# Patient Record
Sex: MALE | Race: WHITE | NOT HISPANIC OR LATINO
[De-identification: names, ages, dates, MRNs, and addresses within clinical notes are randomized per-mention and may not be internally consistent; named-entity substitution may affect disease eponyms.]

---

## 2017-12-05 ENCOUNTER — APPOINTMENT (OUTPATIENT)
Dept: FAMILY MEDICINE | Facility: CLINIC | Age: 47
End: 2017-12-05
Payer: COMMERCIAL

## 2017-12-05 VITALS
DIASTOLIC BLOOD PRESSURE: 84 MMHG | HEART RATE: 69 BPM | OXYGEN SATURATION: 97 % | HEIGHT: 72 IN | SYSTOLIC BLOOD PRESSURE: 114 MMHG | TEMPERATURE: 97.6 F | BODY MASS INDEX: 24.65 KG/M2 | RESPIRATION RATE: 18 BRPM | WEIGHT: 182 LBS

## 2017-12-05 DIAGNOSIS — Z11.3 ENCOUNTER FOR SCREENING FOR INFECTIONS WITH A PREDOMINANTLY SEXUAL MODE OF TRANSMISSION: ICD-10-CM

## 2017-12-05 DIAGNOSIS — Z63.5 DISRUPTION OF FAMILY BY SEPARATION AND DIVORCE: ICD-10-CM

## 2017-12-05 DIAGNOSIS — Z78.9 OTHER SPECIFIED HEALTH STATUS: ICD-10-CM

## 2017-12-05 DIAGNOSIS — F17.200 NICOTINE DEPENDENCE, UNSPECIFIED, UNCOMPLICATED: ICD-10-CM

## 2017-12-05 DIAGNOSIS — Z82.49 FAMILY HISTORY OF ISCHEMIC HEART DISEASE AND OTHER DISEASES OF THE CIRCULATORY SYSTEM: ICD-10-CM

## 2017-12-05 DIAGNOSIS — F15.90 OTHER STIMULANT USE, UNSPECIFIED, UNCOMPLICATED: ICD-10-CM

## 2017-12-05 PROCEDURE — 36415 COLL VENOUS BLD VENIPUNCTURE: CPT

## 2017-12-05 PROCEDURE — 99406 BEHAV CHNG SMOKING 3-10 MIN: CPT

## 2017-12-05 PROCEDURE — 99396 PREV VISIT EST AGE 40-64: CPT | Mod: 25

## 2017-12-05 SDOH — SOCIAL STABILITY - SOCIAL INSECURITY: DISRUPTION OF FAMILY BY SEPARATION AND DIVORCE: Z63.5

## 2017-12-06 DIAGNOSIS — N52.9 MALE ERECTILE DYSFUNCTION, UNSPECIFIED: ICD-10-CM

## 2017-12-06 LAB
ALBUMIN SERPL ELPH-MCNC: 4.5 G/DL
ALP BLD-CCNC: 67 U/L
ALT SERPL-CCNC: 22 U/L
ANION GAP SERPL CALC-SCNC: 14 MMOL/L
AST SERPL-CCNC: 17 U/L
BASOPHILS # BLD AUTO: 0.03 K/UL
BASOPHILS NFR BLD AUTO: 0.4 %
BILIRUB SERPL-MCNC: 0.4 MG/DL
BUN SERPL-MCNC: 9 MG/DL
CALCIUM SERPL-MCNC: 9.8 MG/DL
CHLORIDE SERPL-SCNC: 100 MMOL/L
CHOLEST SERPL-MCNC: 264 MG/DL
CHOLEST/HDLC SERPL: 3.6 RATIO
CO2 SERPL-SCNC: 27 MMOL/L
CREAT SERPL-MCNC: 0.76 MG/DL
EOSINOPHIL # BLD AUTO: 0.13 K/UL
EOSINOPHIL NFR BLD AUTO: 1.7 %
GLUCOSE SERPL-MCNC: 98 MG/DL
HBA1C MFR BLD HPLC: 5.7 %
HCT VFR BLD CALC: 46 %
HDLC SERPL-MCNC: 74 MG/DL
HGB BLD-MCNC: 14.9 G/DL
HIV1+2 AB SPEC QL IA.RAPID: NONREACTIVE
HSV 1+2 IGG SER IA-IMP: NEGATIVE
HSV 1+2 IGG SER IA-IMP: POSITIVE
HSV1 IGG SER QL: 29.3 INDEX
HSV2 IGG SER QL: 0.12 INDEX
IMM GRANULOCYTES NFR BLD AUTO: 0.3 %
LDLC SERPL CALC-MCNC: 167 MG/DL
LYMPHOCYTES # BLD AUTO: 1.5 K/UL
LYMPHOCYTES NFR BLD AUTO: 19.3 %
MAN DIFF?: NORMAL
MCHC RBC-ENTMCNC: 30.6 PG
MCHC RBC-ENTMCNC: 32.4 GM/DL
MCV RBC AUTO: 94.5 FL
MONOCYTES # BLD AUTO: 0.75 K/UL
MONOCYTES NFR BLD AUTO: 9.6 %
NEUTROPHILS # BLD AUTO: 5.36 K/UL
NEUTROPHILS NFR BLD AUTO: 68.7 %
PLATELET # BLD AUTO: 335 K/UL
POTASSIUM SERPL-SCNC: 4.8 MMOL/L
PROT SERPL-MCNC: 7.4 G/DL
RBC # BLD: 4.87 M/UL
RBC # FLD: 14.7 %
SODIUM SERPL-SCNC: 141 MMOL/L
T PALLIDUM AB SER QL IA: NEGATIVE
T4 FREE SERPL-MCNC: 1.2 NG/DL
TRIGL SERPL-MCNC: 116 MG/DL
TSH SERPL-ACNC: 2.11 UIU/ML
WBC # FLD AUTO: 7.79 K/UL

## 2017-12-07 LAB
C TRACH RRNA SPEC QL NAA+PROBE: NOT DETECTED
N GONORRHOEA RRNA SPEC QL NAA+PROBE: NOT DETECTED
SOURCE AMPLIFICATION: NORMAL

## 2017-12-08 LAB
HSV1 IGM SER QL: NORMAL TITER
HSV2 AB FLD-ACNC: NORMAL TITER

## 2017-12-12 ENCOUNTER — CLINICAL ADVICE (OUTPATIENT)
Age: 47
End: 2017-12-12

## 2018-07-24 PROBLEM — Z78.9 ALCOHOL USE: Status: ACTIVE | Noted: 2017-12-05

## 2019-01-08 ENCOUNTER — EMERGENCY (EMERGENCY)
Facility: HOSPITAL | Age: 49
LOS: 1 days | Discharge: HOME | End: 2019-01-08
Attending: EMERGENCY MEDICINE
Payer: MEDICAID

## 2019-01-08 VITALS
OXYGEN SATURATION: 95 % | RESPIRATION RATE: 19 BRPM | TEMPERATURE: 99 F | SYSTOLIC BLOOD PRESSURE: 108 MMHG | HEART RATE: 70 BPM | DIASTOLIC BLOOD PRESSURE: 72 MMHG

## 2019-01-08 VITALS
OXYGEN SATURATION: 98 % | SYSTOLIC BLOOD PRESSURE: 107 MMHG | HEIGHT: 72 IN | TEMPERATURE: 98 F | RESPIRATION RATE: 16 BRPM | DIASTOLIC BLOOD PRESSURE: 74 MMHG | HEART RATE: 79 BPM | WEIGHT: 179.9 LBS

## 2019-01-08 LAB
APPEARANCE UR: CLEAR — SIGNIFICANT CHANGE UP
BACTERIA # UR AUTO: NEGATIVE — SIGNIFICANT CHANGE UP
BILIRUB UR-MCNC: NEGATIVE — SIGNIFICANT CHANGE UP
COLOR SPEC: YELLOW — SIGNIFICANT CHANGE UP
DIFF PNL FLD: NEGATIVE — SIGNIFICANT CHANGE UP
EPI CELLS # UR: 0 /HPF — SIGNIFICANT CHANGE UP
GLUCOSE UR QL: NEGATIVE — SIGNIFICANT CHANGE UP
HYALINE CASTS # UR AUTO: 0 /LPF — SIGNIFICANT CHANGE UP (ref 0–2)
KETONES UR-MCNC: NEGATIVE — SIGNIFICANT CHANGE UP
LEUKOCYTE ESTERASE UR-ACNC: NEGATIVE — SIGNIFICANT CHANGE UP
NITRITE UR-MCNC: NEGATIVE — SIGNIFICANT CHANGE UP
PH UR: 6 — SIGNIFICANT CHANGE UP (ref 5–8)
PROT UR-MCNC: ABNORMAL
RBC CASTS # UR COMP ASSIST: 3 /HPF — SIGNIFICANT CHANGE UP (ref 0–4)
SP GR SPEC: 1.03 — HIGH (ref 1.01–1.02)
UROBILINOGEN FLD QL: ABNORMAL
WBC UR QL: 1 /HPF — SIGNIFICANT CHANGE UP (ref 0–5)

## 2019-01-08 PROCEDURE — 99283 EMERGENCY DEPT VISIT LOW MDM: CPT

## 2019-01-08 PROCEDURE — 81001 URINALYSIS AUTO W/SCOPE: CPT

## 2019-01-08 PROCEDURE — 99282 EMERGENCY DEPT VISIT SF MDM: CPT

## 2019-01-08 RX ORDER — ACETAMINOPHEN 500 MG
975 TABLET ORAL ONCE
Qty: 0 | Refills: 0 | Status: COMPLETED | OUTPATIENT
Start: 2019-01-08 | End: 2019-01-08

## 2019-01-08 RX ADMIN — Medication 975 MILLIGRAM(S): at 16:13

## 2019-01-08 NOTE — ED PROVIDER NOTE - PHYSICAL EXAMINATION
GEN APPEARANCE: WDWN, alert and cooperative, non-toxic appearing and in NAD  HEAD: Atraumatic, normocephalic   EYES: PERRLa, EOMI, vision grossly intact.   EARS: Gross hearing intact.   NOSE: No nasal discharge, no external evidence of epistaxis.   NECK: Supple  CV: RRR, S1S2, no c/r/m/g. No cyanosis or pallor. Extremities warm, well perfused. Cap refill <2 seconds. No bruits.   LUNGS: CTAB. No wheezing. No rales. No rhonchi. No diminished breath sounds.   ABDOMEN: Soft, NTND. No guarding or rebound. No masses.   MSK: Spine appears normal, no spine point tenderness. No CVA ttp. No joint erythema or tenderness. Normal muscular development. Pelvis stable. Mild R flank ttp lateral   EXTREMITIES: No peripheral edema. No obvious joint or bony deformity.  NEURO: Alert, follows commands. Weight bearing normal. Speech normal. Sensation and motor normal x4 extremities.   SKIN: Normal color for race, warm, dry and intact. No evidence of rash.  PSYCH: Normal mood and affect.

## 2019-01-08 NOTE — ED PROVIDER NOTE - CARE PLAN
Principal Discharge DX:	Flank pain  Assessment and plan of treatment:	Thank you for visiting our Emergency Department, it has been a pleasure taking part in your healthcare.    Your discharge diagnosis is: flank pain  Please take all discharge medications as indicated below:  Take Motrin/Tylenol for pain as needed, please follow instructions on manufacturers label. If you have any questions please consult a pharmacist or your PMD.  Please follow up with your PMD within x48 hours.  Return precautions to the Emergency Department include but are not limited to: unrelenting nausea, vomiting, fever, chills, chest pain, shortness of breath, dizziness, chest or abdominal pain, worsening back pain, syncope, blood in urine or stool, headache that doesn't resolve, numbness or tingling, loss of sensation, loss of motor function, or any other concerning symptoms.

## 2019-01-08 NOTE — ED ADULT NURSE NOTE - OBJECTIVE STATEMENT
47 y/o M A&Ox3 with no pertinent PMH presents to the ED c.o ABD pain. Pt. reports he developed ABD pain on Saturday after eating food from a deli. Pt. reports he became nauseous after, began vomiting and experiencing sharp and achy ABD pain. Pt. reports he has not vomited since Sunday. Denies blood in emesis. Denies fever, chills, dizziness, HA, vision changes, dysuria, and hematuria. Pt. denies nausea at this time. Reports ABD pain currently 6/10. Reports he took Tylenol yesterday for pain which provided relief, denies taking any pain medication today. ABD soft and nontender, pos. bowel sounds auscultated. Pt. mentating well. Does not appear to be in any acute distress - nonlabored breathing noted and speaking in full coherent sentences. Safety and comfort provided. As per MD, hold off on lab work at this time.

## 2019-01-08 NOTE — ED PROVIDER NOTE - MEDICAL DECISION MAKING DETAILS
Binh PGY2: 48M no pmh presents with a cc of R sided lower flank pain worsening x4 days, initially was exacerbated in setting of eating questionable sandwich thereafter had repeated episodes n/v x2 days resolved tolerating PO since, now w/ persisting R flank pain described as sharp achy intermittent never had before no prior stones exam vss well appearing mild r lateral lower flank ttp low c/f surgical abdomen low c/f stone however will check urine, po trail reassess Binh PGY2: 48M no pmh presents with a cc of R sided lower flank pain worsening x4 days, initially was exacerbated in setting of eating questionable sandwich thereafter had repeated episodes n/v x2 days resolved tolerating PO since, now w/ persisting R flank pain described as sharp achy intermittent never had before no prior stones exam vss well appearing r lateral lower flank non tender low c/f surgical abdomen low c/f stone however will check urine, po trial reassess  Dany: 48 year old male with right lower flankp ain x 4 days. episodes of n/v x 2 days now resolved and tolterating po.  no sick contacts, no travel. nontoxic appearing, well-appearing male, abdomen soft nt/nd. vss. will get ua to r/o uti/hematuria, pain control, reassess.

## 2019-01-08 NOTE — ED PROVIDER NOTE - PLAN OF CARE
Thank you for visiting our Emergency Department, it has been a pleasure taking part in your healthcare.    Your discharge diagnosis is: flank pain  Please take all discharge medications as indicated below:  Take Motrin/Tylenol for pain as needed, please follow instructions on manufacturers label. If you have any questions please consult a pharmacist or your PMD.  Please follow up with your PMD within x48 hours.  Return precautions to the Emergency Department include but are not limited to: unrelenting nausea, vomiting, fever, chills, chest pain, shortness of breath, dizziness, chest or abdominal pain, worsening back pain, syncope, blood in urine or stool, headache that doesn't resolve, numbness or tingling, loss of sensation, loss of motor function, or any other concerning symptoms.

## 2019-03-05 ENCOUNTER — INPATIENT (INPATIENT)
Facility: HOSPITAL | Age: 49
LOS: 3 days | Discharge: HOME | DRG: 373 | End: 2019-03-09
Attending: SURGERY | Admitting: SURGERY
Payer: MEDICAID

## 2019-03-05 VITALS
HEART RATE: 74 BPM | OXYGEN SATURATION: 97 % | RESPIRATION RATE: 17 BRPM | SYSTOLIC BLOOD PRESSURE: 112 MMHG | WEIGHT: 179.9 LBS | HEIGHT: 72 IN | DIASTOLIC BLOOD PRESSURE: 72 MMHG | TEMPERATURE: 98 F

## 2019-03-05 DIAGNOSIS — K35.32 ACUTE APPENDICITIS WITH PERFORATION, LOCALIZED PERITONITIS, AND GANGRENE, WITHOUT ABSCESS: ICD-10-CM

## 2019-03-05 LAB
ALBUMIN SERPL ELPH-MCNC: 4.5 G/DL — SIGNIFICANT CHANGE UP (ref 3.3–5)
ALP SERPL-CCNC: 64 U/L — SIGNIFICANT CHANGE UP (ref 40–120)
ALT FLD-CCNC: 58 U/L — HIGH (ref 10–45)
ANION GAP SERPL CALC-SCNC: 16 MMOL/L — SIGNIFICANT CHANGE UP (ref 5–17)
APPEARANCE UR: ABNORMAL
AST SERPL-CCNC: 25 U/L — SIGNIFICANT CHANGE UP (ref 10–40)
BACTERIA # UR AUTO: NEGATIVE — SIGNIFICANT CHANGE UP
BASOPHILS # BLD AUTO: 0 K/UL — SIGNIFICANT CHANGE UP (ref 0–0.2)
BASOPHILS NFR BLD AUTO: 0.1 % — SIGNIFICANT CHANGE UP (ref 0–2)
BILIRUB SERPL-MCNC: 0.5 MG/DL — SIGNIFICANT CHANGE UP (ref 0.2–1.2)
BILIRUB UR-MCNC: NEGATIVE — SIGNIFICANT CHANGE UP
BUN SERPL-MCNC: 10 MG/DL — SIGNIFICANT CHANGE UP (ref 7–23)
CALCIUM SERPL-MCNC: 9.5 MG/DL — SIGNIFICANT CHANGE UP (ref 8.4–10.5)
CHLORIDE SERPL-SCNC: 98 MMOL/L — SIGNIFICANT CHANGE UP (ref 96–108)
CO2 SERPL-SCNC: 23 MMOL/L — SIGNIFICANT CHANGE UP (ref 22–31)
COLOR SPEC: YELLOW — SIGNIFICANT CHANGE UP
CREAT SERPL-MCNC: 0.82 MG/DL — SIGNIFICANT CHANGE UP (ref 0.5–1.3)
DIFF PNL FLD: NEGATIVE — SIGNIFICANT CHANGE UP
EOSINOPHIL # BLD AUTO: 0 K/UL — SIGNIFICANT CHANGE UP (ref 0–0.5)
EOSINOPHIL NFR BLD AUTO: 0.1 % — SIGNIFICANT CHANGE UP (ref 0–6)
EPI CELLS # UR: 2 /HPF — SIGNIFICANT CHANGE UP
GAS PNL BLDV: SIGNIFICANT CHANGE UP
GLUCOSE SERPL-MCNC: 127 MG/DL — HIGH (ref 70–99)
GLUCOSE UR QL: NEGATIVE — SIGNIFICANT CHANGE UP
HCT VFR BLD CALC: 41.5 % — SIGNIFICANT CHANGE UP (ref 39–50)
HGB BLD-MCNC: 14.7 G/DL — SIGNIFICANT CHANGE UP (ref 13–17)
HYALINE CASTS # UR AUTO: 8 /LPF — HIGH (ref 0–2)
KETONES UR-MCNC: ABNORMAL
LEUKOCYTE ESTERASE UR-ACNC: NEGATIVE — SIGNIFICANT CHANGE UP
LIDOCAIN IGE QN: 17 U/L — SIGNIFICANT CHANGE UP (ref 7–60)
LYMPHOCYTES # BLD AUTO: 0.8 K/UL — LOW (ref 1–3.3)
LYMPHOCYTES # BLD AUTO: 4.2 % — LOW (ref 13–44)
MCHC RBC-ENTMCNC: 31.2 PG — SIGNIFICANT CHANGE UP (ref 27–34)
MCHC RBC-ENTMCNC: 35.3 GM/DL — SIGNIFICANT CHANGE UP (ref 32–36)
MCV RBC AUTO: 88.2 FL — SIGNIFICANT CHANGE UP (ref 80–100)
MONOCYTES # BLD AUTO: 1.6 K/UL — HIGH (ref 0–0.9)
MONOCYTES NFR BLD AUTO: 8.1 % — SIGNIFICANT CHANGE UP (ref 2–14)
NEUTROPHILS # BLD AUTO: 17.3 K/UL — HIGH (ref 1.8–7.4)
NEUTROPHILS NFR BLD AUTO: 87.6 % — HIGH (ref 43–77)
NITRITE UR-MCNC: NEGATIVE — SIGNIFICANT CHANGE UP
PH UR: 5.5 — SIGNIFICANT CHANGE UP (ref 5–8)
PLATELET # BLD AUTO: 292 K/UL — SIGNIFICANT CHANGE UP (ref 150–400)
POTASSIUM SERPL-MCNC: 4 MMOL/L — SIGNIFICANT CHANGE UP (ref 3.5–5.3)
POTASSIUM SERPL-SCNC: 4 MMOL/L — SIGNIFICANT CHANGE UP (ref 3.5–5.3)
PROT SERPL-MCNC: 7.5 G/DL — SIGNIFICANT CHANGE UP (ref 6–8.3)
PROT UR-MCNC: ABNORMAL
RBC # BLD: 4.7 M/UL — SIGNIFICANT CHANGE UP (ref 4.2–5.8)
RBC # FLD: 12.5 % — SIGNIFICANT CHANGE UP (ref 10.3–14.5)
RBC CASTS # UR COMP ASSIST: 4 /HPF — SIGNIFICANT CHANGE UP (ref 0–4)
SODIUM SERPL-SCNC: 137 MMOL/L — SIGNIFICANT CHANGE UP (ref 135–145)
SP GR SPEC: 1.03 — HIGH (ref 1.01–1.02)
UROBILINOGEN FLD QL: ABNORMAL
WBC # BLD: 19.7 K/UL — HIGH (ref 3.8–10.5)
WBC # FLD AUTO: 19.7 K/UL — HIGH (ref 3.8–10.5)
WBC UR QL: 3 /HPF — SIGNIFICANT CHANGE UP (ref 0–5)

## 2019-03-05 PROCEDURE — 99285 EMERGENCY DEPT VISIT HI MDM: CPT

## 2019-03-05 PROCEDURE — 99222 1ST HOSP IP/OBS MODERATE 55: CPT | Mod: AI

## 2019-03-05 PROCEDURE — 74177 CT ABD & PELVIS W/CONTRAST: CPT | Mod: 26

## 2019-03-05 RX ORDER — PIPERACILLIN AND TAZOBACTAM 4; .5 G/20ML; G/20ML
3.38 INJECTION, POWDER, LYOPHILIZED, FOR SOLUTION INTRAVENOUS EVERY 8 HOURS
Qty: 0 | Refills: 0 | Status: DISCONTINUED | OUTPATIENT
Start: 2019-03-05 | End: 2019-03-09

## 2019-03-05 RX ORDER — SODIUM CHLORIDE 9 MG/ML
1000 INJECTION, SOLUTION INTRAVENOUS
Qty: 0 | Refills: 0 | Status: DISCONTINUED | OUTPATIENT
Start: 2019-03-05 | End: 2019-03-06

## 2019-03-05 RX ORDER — MORPHINE SULFATE 50 MG/1
4 CAPSULE, EXTENDED RELEASE ORAL ONCE
Qty: 0 | Refills: 0 | Status: DISCONTINUED | OUTPATIENT
Start: 2019-03-05 | End: 2019-03-05

## 2019-03-05 RX ORDER — PIPERACILLIN AND TAZOBACTAM 4; .5 G/20ML; G/20ML
3.38 INJECTION, POWDER, LYOPHILIZED, FOR SOLUTION INTRAVENOUS ONCE
Qty: 0 | Refills: 0 | Status: COMPLETED | OUTPATIENT
Start: 2019-03-05 | End: 2019-03-05

## 2019-03-05 RX ORDER — ONDANSETRON 8 MG/1
4 TABLET, FILM COATED ORAL ONCE
Qty: 0 | Refills: 0 | Status: COMPLETED | OUTPATIENT
Start: 2019-03-05 | End: 2019-03-05

## 2019-03-05 RX ORDER — SODIUM CHLORIDE 9 MG/ML
1000 INJECTION INTRAMUSCULAR; INTRAVENOUS; SUBCUTANEOUS ONCE
Qty: 0 | Refills: 0 | Status: COMPLETED | OUTPATIENT
Start: 2019-03-05 | End: 2019-03-05

## 2019-03-05 RX ORDER — METRONIDAZOLE 500 MG
500 TABLET ORAL ONCE
Qty: 0 | Refills: 0 | Status: COMPLETED | OUTPATIENT
Start: 2019-03-05 | End: 2019-03-05

## 2019-03-05 RX ORDER — KETOROLAC TROMETHAMINE 30 MG/ML
15 SYRINGE (ML) INJECTION ONCE
Qty: 0 | Refills: 0 | Status: DISCONTINUED | OUTPATIENT
Start: 2019-03-05 | End: 2019-03-05

## 2019-03-05 RX ORDER — ENOXAPARIN SODIUM 100 MG/ML
40 INJECTION SUBCUTANEOUS EVERY 24 HOURS
Qty: 0 | Refills: 0 | Status: DISCONTINUED | OUTPATIENT
Start: 2019-03-05 | End: 2019-03-09

## 2019-03-05 RX ADMIN — PIPERACILLIN AND TAZOBACTAM 200 GRAM(S): 4; .5 INJECTION, POWDER, LYOPHILIZED, FOR SOLUTION INTRAVENOUS at 20:25

## 2019-03-05 RX ADMIN — MORPHINE SULFATE 4 MILLIGRAM(S): 50 CAPSULE, EXTENDED RELEASE ORAL at 21:41

## 2019-03-05 RX ADMIN — Medication 100 MILLIGRAM(S): at 21:47

## 2019-03-05 RX ADMIN — SODIUM CHLORIDE 1000 MILLILITER(S): 9 INJECTION INTRAMUSCULAR; INTRAVENOUS; SUBCUTANEOUS at 18:28

## 2019-03-05 RX ADMIN — MORPHINE SULFATE 4 MILLIGRAM(S): 50 CAPSULE, EXTENDED RELEASE ORAL at 20:47

## 2019-03-05 RX ADMIN — ONDANSETRON 4 MILLIGRAM(S): 8 TABLET, FILM COATED ORAL at 18:29

## 2019-03-05 RX ADMIN — Medication 15 MILLIGRAM(S): at 20:25

## 2019-03-05 RX ADMIN — Medication 15 MILLIGRAM(S): at 18:29

## 2019-03-05 NOTE — ED PROVIDER NOTE - PHYSICAL EXAMINATION
Cara Givens M.D.:   patient awake alert seen sitting on stretcher uncomfortable appearing .   LUNGS CTAB no wheeze no crackle.   CARD RRR no m/r/g.    Abdomen soft ttp rlq +RCVAT ND no rebound no guarding .   EXT WWP no edema no calf tenderness CV 2+DP/PT bilaterally.   neuro A&Ox3 gait normal.    skin warm and dry no rash  HEENT: dry mucous membranes, PERRL, EOMI

## 2019-03-05 NOTE — H&P ADULT - ATTENDING COMMENTS
Pt seen and examined, agree with above. Pt presented with abdominal pain, was hemodynamically normal, with mild RLQ tenderness. Leukocytosis present, with WBC 19. I personally reviewed pt's CT images, which demonstrated acute appendicitis with cesar-appendiceal inflammatory changes and a small abscess not amenable to percutaneous drainage due to small size. Pt was admitted for nonoperative management of perforated appendicitis with small abscess, with IV antibiotics, NPO until pain improves, IVF, serial abdominal exams. I explained this management to the patient, including rationale for nonoperative management (increased complication rate for operative management of perforated appendicitis).

## 2019-03-05 NOTE — ED PROVIDER NOTE - NO SIGNIFICANT PAST SURGICAL HISTORY
Pt. Presents to ed today for complaints of L knee pain that started 6 weeks ago when he fell down a step. PT. Alert and oriented x4. PT. Placed in position of comfort with call bell in reach.
<<----- Click to add NO significant Past Surgical History

## 2019-03-05 NOTE — H&P ADULT - NSHPSOCIALHISTORY_GEN_ALL_CORE
ETOH: Socially  TOB: 0.5ppd x 10 years; quit few months ago  Illicits: Takes non-prescribed vicodin for hand pain   Lives with girlfriend; works as  at [employer]

## 2019-03-05 NOTE — H&P ADULT - HISTORY OF PRESENT ILLNESS
This is a 49 y/o male with no significant medical history who presents to the ED with c/o abdominal pain for the past 3 days.  Pt states that he first felt similar pain approx 2 months ago and came to the ED and had a negative work-up and was discharged with resolution within a few days time.  This time however he felt similar pain and it was worsening in severity with radiating pain to his RLQ.  He denies nausea, vomiting, fevers, chills, but he does report changes in his bowel pattern and habits.  Pt does report 1 day of anorexia and denies dysuria or frequency or hesitancy.  In the ED pt was hemodynamically stable and received a CT scan of the abdomen and pelvis which revealed a perforated appendicitis with small fluid collection and fat stranding.  General Surgery consulted for surgical evaluation.

## 2019-03-05 NOTE — H&P ADULT - ASSESSMENT
This is a 47 y/o male who resents with abdominal pain x3 days with imaging revealing acute perforated appendicitis    -Admit to Surgery, Dr. Moreno  -Non-op management   -IVF  -NPO  -IV Zosyn  -Serial Abdominal exams  -Routine labs in am  -Ambulate as tolertaed  -Lovenox for DVT prophylaxis  -IS  -D/w Dr. Moreno

## 2019-03-05 NOTE — H&P ADULT - NSHPPHYSICALEXAM_GEN_ALL_CORE
General: NAD, Pleasant  Neurology: Patient is AA&Ox4, follows commands, and speech fluent. EOMI intact, PERRLA, 3mm--2mm. Sensation in the Trigeminal distribution is wnl and symmetrical. Facial muscles intact and symmetrical. Hearing appropriate. Uvula rises equally upon phonation and tongue protrudes symmetrically. SCM/Trapezius 5/5 power.  Neck: Neck supple, trachea midline, No JVD  Respiratory: CTA B/L, (-)rales, rhonchi  CV: S1S2, r/r/r, (-)m/r/g  Abdomen: Soft, non-distended; slight tenderness to RLQ to deep palpation; BSx4  Extremities: 2+ peripheral pulses bilat throughout; (-)edema appreciated  Skin: No Rashes, Hematoma, Ecchymosis

## 2019-03-05 NOTE — ED PROVIDER NOTE - ATTENDING CONTRIBUTION TO CARE
48M no reported pmh p/w 3 days of intermittent n/v/d (NBNB, NB), now also associated with severe intermittent sharp right flank pain with diarrhea today, nausea for three days abd soft, nt, no cva tend, no uti sts. likely gi in nature, r/o kindey stone vs intrabominal cause. 48M no reported pmh p/w 3 days of intermittent n/v/d (NBNB, NB), now also associated with severe intermittent sharp right flank pain with diarrhea today, nausea for three days abd soft, nt, no cva tend, no uti sts. likely gi in nature, r/o kidney stone vs intraabdominal cause.

## 2019-03-05 NOTE — ED ADULT NURSE NOTE - OBJECTIVE STATEMENT
48 y.o. Male presents to the ED c/o vomiting and R flank pain. Denies PMhx. As per patient he started to feel nauseous x4 days. Pt started to vomit (nonbloody) and feeling R flank pain today. +CVA tenderness noted on R side. A&Ox3. Ambulatory. Pt aware of needing to provide urine sample. Pt is in no current distress. Comfort and safety provided. Will continue to monitor.

## 2019-03-05 NOTE — ED PROVIDER NOTE - OBJECTIVE STATEMENT
PETER PinedaD: 48M no reported pmh p/w 3 days of intermittent n/v/d (NBNB, NB), now also associated with severe intermittent sharp right flank pain radiating to rlq. no f/c. pt notes he feels dehydrated. no dysuria or hematuria. had similar symptoms 2 months ago without etiology elucidated.

## 2019-03-05 NOTE — H&P ADULT - NSHPLABSRESULTS_GEN_ALL_CORE
03-05    137  |  98  |  10  ----------------------------<  127<H>  4.0   |  23  |  0.82    Ca    9.5      05 Mar 2019 18:49    TPro  7.5  /  Alb  4.5  /  TBili  0.5  /  DBili  x   /  AST  25  /  ALT  58<H>  /  AlkPhos  64  03-05                          14.7   19.7  )-----------( 292      ( 05 Mar 2019 18:49 )             41.5     < from: CT Abdomen and Pelvis w/ IV Cont (03.05.19 @ 19:44) >    FINDINGS:    LOWER CHEST: Within normal limits.    LIVER: There is a 1.6 cm cyst within the right hepatic lobe.  BILE DUCTS: Normal caliber.  GALLBLADDER: Within normal limits.  SPLEEN: Within normal limits.  PANCREAS: Within normal limits.  ADRENALS: Within normal limits.  KIDNEYS/URETERS: Within normal limits.    BLADDER: Within normal limits.  REPRODUCTIVE ORGANS: Prostate within normal limits.    BOWEL: No bowel obstruction. Fat stranding adjacent to the region of the   appendix. Mid to distal appendix measures up to 9 mm. There is a 1.6 x   1.6 cm collection at the tip of the appendix with a small focus of   extraluminal air. Diverticulosis without diverticulitis.  PERITONEUM: No ascites.  VESSELS:  Within normal limits.  RETROPERITONEUM: No lymphadenopathy.    ABDOMINAL WALL: Within normal limits.  BONES: Within normal limits.    IMPRESSION: Perforated appendicitis with an adjacent fluid collections   above.

## 2019-03-06 LAB
ANION GAP SERPL CALC-SCNC: 12 MMOL/L — SIGNIFICANT CHANGE UP (ref 5–17)
BUN SERPL-MCNC: 8 MG/DL — SIGNIFICANT CHANGE UP (ref 7–23)
CALCIUM SERPL-MCNC: 9.1 MG/DL — SIGNIFICANT CHANGE UP (ref 8.4–10.5)
CHLORIDE SERPL-SCNC: 101 MMOL/L — SIGNIFICANT CHANGE UP (ref 96–108)
CO2 SERPL-SCNC: 26 MMOL/L — SIGNIFICANT CHANGE UP (ref 22–31)
CREAT SERPL-MCNC: 0.85 MG/DL — SIGNIFICANT CHANGE UP (ref 0.5–1.3)
CULTURE RESULTS: NO GROWTH — SIGNIFICANT CHANGE UP
GLUCOSE SERPL-MCNC: 100 MG/DL — HIGH (ref 70–99)
HCT VFR BLD CALC: 37.5 % — LOW (ref 39–50)
HGB BLD-MCNC: 13.6 G/DL — SIGNIFICANT CHANGE UP (ref 13–17)
MAGNESIUM SERPL-MCNC: 1.9 MG/DL — SIGNIFICANT CHANGE UP (ref 1.6–2.6)
MCHC RBC-ENTMCNC: 31.6 PG — SIGNIFICANT CHANGE UP (ref 27–34)
MCHC RBC-ENTMCNC: 36.2 GM/DL — HIGH (ref 32–36)
MCV RBC AUTO: 87.4 FL — SIGNIFICANT CHANGE UP (ref 80–100)
PHOSPHATE SERPL-MCNC: 2.6 MG/DL — SIGNIFICANT CHANGE UP (ref 2.5–4.5)
PLATELET # BLD AUTO: 251 K/UL — SIGNIFICANT CHANGE UP (ref 150–400)
POTASSIUM SERPL-MCNC: 3.6 MMOL/L — SIGNIFICANT CHANGE UP (ref 3.5–5.3)
POTASSIUM SERPL-SCNC: 3.6 MMOL/L — SIGNIFICANT CHANGE UP (ref 3.5–5.3)
RBC # BLD: 4.29 M/UL — SIGNIFICANT CHANGE UP (ref 4.2–5.8)
RBC # FLD: 12.4 % — SIGNIFICANT CHANGE UP (ref 10.3–14.5)
SODIUM SERPL-SCNC: 139 MMOL/L — SIGNIFICANT CHANGE UP (ref 135–145)
SPECIMEN SOURCE: SIGNIFICANT CHANGE UP
WBC # BLD: 14.4 K/UL — HIGH (ref 3.8–10.5)
WBC # FLD AUTO: 14.4 K/UL — HIGH (ref 3.8–10.5)

## 2019-03-06 PROCEDURE — 99232 SBSQ HOSP IP/OBS MODERATE 35: CPT

## 2019-03-06 RX ORDER — ACETAMINOPHEN 500 MG
975 TABLET ORAL EVERY 6 HOURS
Qty: 0 | Refills: 0 | Status: DISCONTINUED | OUTPATIENT
Start: 2019-03-06 | End: 2019-03-09

## 2019-03-06 RX ORDER — ACETAMINOPHEN 500 MG
1000 TABLET ORAL ONCE
Qty: 0 | Refills: 0 | Status: DISCONTINUED | OUTPATIENT
Start: 2019-03-06 | End: 2019-03-06

## 2019-03-06 RX ORDER — ACETAMINOPHEN 500 MG
1000 TABLET ORAL ONCE
Qty: 0 | Refills: 0 | Status: COMPLETED | OUTPATIENT
Start: 2019-03-06 | End: 2019-03-06

## 2019-03-06 RX ORDER — CALCIUM CARBONATE 500(1250)
1 TABLET ORAL ONCE
Qty: 0 | Refills: 0 | Status: COMPLETED | OUTPATIENT
Start: 2019-03-06 | End: 2019-03-06

## 2019-03-06 RX ORDER — OXYCODONE HYDROCHLORIDE 5 MG/1
5 TABLET ORAL EVERY 4 HOURS
Qty: 0 | Refills: 0 | Status: DISCONTINUED | OUTPATIENT
Start: 2019-03-06 | End: 2019-03-09

## 2019-03-06 RX ORDER — DEXTROSE MONOHYDRATE, SODIUM CHLORIDE, AND POTASSIUM CHLORIDE 50; .745; 4.5 G/1000ML; G/1000ML; G/1000ML
1000 INJECTION, SOLUTION INTRAVENOUS
Qty: 0 | Refills: 0 | Status: DISCONTINUED | OUTPATIENT
Start: 2019-03-06 | End: 2019-03-08

## 2019-03-06 RX ORDER — OXYCODONE HYDROCHLORIDE 5 MG/1
10 TABLET ORAL EVERY 4 HOURS
Qty: 0 | Refills: 0 | Status: DISCONTINUED | OUTPATIENT
Start: 2019-03-06 | End: 2019-03-06

## 2019-03-06 RX ORDER — INFLUENZA VIRUS VACCINE 15; 15; 15; 15 UG/.5ML; UG/.5ML; UG/.5ML; UG/.5ML
0.5 SUSPENSION INTRAMUSCULAR ONCE
Qty: 0 | Refills: 0 | Status: DISCONTINUED | OUTPATIENT
Start: 2019-03-06 | End: 2019-03-09

## 2019-03-06 RX ADMIN — Medication 1000 MILLIGRAM(S): at 09:53

## 2019-03-06 RX ADMIN — OXYCODONE HYDROCHLORIDE 5 MILLIGRAM(S): 5 TABLET ORAL at 16:19

## 2019-03-06 RX ADMIN — Medication 1 TABLET(S): at 18:32

## 2019-03-06 RX ADMIN — Medication 1000 MILLIGRAM(S): at 14:57

## 2019-03-06 RX ADMIN — Medication 400 MILLIGRAM(S): at 02:25

## 2019-03-06 RX ADMIN — Medication 400 MILLIGRAM(S): at 14:27

## 2019-03-06 RX ADMIN — Medication 400 MILLIGRAM(S): at 09:23

## 2019-03-06 RX ADMIN — SODIUM CHLORIDE 100 MILLILITER(S): 9 INJECTION, SOLUTION INTRAVENOUS at 09:25

## 2019-03-06 RX ADMIN — OXYCODONE HYDROCHLORIDE 5 MILLIGRAM(S): 5 TABLET ORAL at 23:09

## 2019-03-06 RX ADMIN — PIPERACILLIN AND TAZOBACTAM 25 GRAM(S): 4; .5 INJECTION, POWDER, LYOPHILIZED, FOR SOLUTION INTRAVENOUS at 03:52

## 2019-03-06 RX ADMIN — ENOXAPARIN SODIUM 40 MILLIGRAM(S): 100 INJECTION SUBCUTANEOUS at 05:09

## 2019-03-06 RX ADMIN — PIPERACILLIN AND TAZOBACTAM 25 GRAM(S): 4; .5 INJECTION, POWDER, LYOPHILIZED, FOR SOLUTION INTRAVENOUS at 13:06

## 2019-03-06 RX ADMIN — PIPERACILLIN AND TAZOBACTAM 25 GRAM(S): 4; .5 INJECTION, POWDER, LYOPHILIZED, FOR SOLUTION INTRAVENOUS at 19:56

## 2019-03-06 RX ADMIN — OXYCODONE HYDROCHLORIDE 5 MILLIGRAM(S): 5 TABLET ORAL at 16:49

## 2019-03-06 RX ADMIN — OXYCODONE HYDROCHLORIDE 5 MILLIGRAM(S): 5 TABLET ORAL at 23:45

## 2019-03-06 RX ADMIN — Medication 975 MILLIGRAM(S): at 19:55

## 2019-03-06 RX ADMIN — SODIUM CHLORIDE 100 MILLILITER(S): 9 INJECTION, SOLUTION INTRAVENOUS at 02:25

## 2019-03-06 RX ADMIN — Medication 1000 MILLIGRAM(S): at 03:15

## 2019-03-06 RX ADMIN — Medication 975 MILLIGRAM(S): at 20:30

## 2019-03-06 NOTE — PROGRESS NOTE ADULT - ATTENDING COMMENTS
Patient seen and examined  Doing well  States that pain has improved from admission  non-toxic appearing  abd exam - ++ RUQ tenderness, soft, no peritoneal signs    WBC 19 -> 14    - Continue non-operative treatment of perforated appendicitis with IV antibiotics

## 2019-03-06 NOTE — PROGRESS NOTE ADULT - SUBJECTIVE AND OBJECTIVE BOX
[facility] SURGERY DAILY PROGRESS NOTE    SUBJECTIVE:  -  doing ok overnight, still having significant pain but improved  -  no n/v while NPO    OBJECTIVE:    Vital Signs Last 24 Hrs  T(C): 37.4 (06 Mar 2019 04:32), Max: 37.4 (06 Mar 2019 02:00)  T(F): 99.4 (06 Mar 2019 04:32), Max: 99.4 (06 Mar 2019 04:32)  HR: 83 (06 Mar 2019 04:32) (72 - 88)  BP: 109/67 (06 Mar 2019 04:32) (108/62 - 114/72)  BP(mean): --  RR: 18 (06 Mar 2019 04:32) (17 - 18)  SpO2: 94% (06 Mar 2019 04:32) (94% - 100%)    I&O's Detail    05 Mar 2019 07:01  -  06 Mar 2019 07:00  --------------------------------------------------------  IN:    IV PiggyBack: 100 mL    lactated ringers.: 800 mL  Total IN: 900 mL    OUT:  Total OUT: 0 mL    Total NET: 900 mL        LABS:                        14.7   19.7  )-----------( 292      ( 05 Mar 2019 18:49 )             41.5     03-05    137  |  98  |  10  ----------------------------<  127<H>  4.0   |  23  |  0.82    Ca    9.5      05 Mar 2019 18:49    TPro  7.5  /  Alb  4.5  /  TBili  0.5  /  DBili  x   /  AST  25  /  ALT  58<H>  /  AlkPhos  64  03-05    EXAM:  Gen:       alert, in NAD  Lungs:       unlabored breathing  CV:       regular rate, rhythm  Abd:       nondistended, tender to palpation most over the RLQ, RUQ, no rebound or guarding  Ext:        no edema

## 2019-03-07 LAB
ANION GAP SERPL CALC-SCNC: 12 MMOL/L — SIGNIFICANT CHANGE UP (ref 5–17)
BUN SERPL-MCNC: 7 MG/DL — SIGNIFICANT CHANGE UP (ref 7–23)
CALCIUM SERPL-MCNC: 9.1 MG/DL — SIGNIFICANT CHANGE UP (ref 8.4–10.5)
CHLORIDE SERPL-SCNC: 99 MMOL/L — SIGNIFICANT CHANGE UP (ref 96–108)
CO2 SERPL-SCNC: 23 MMOL/L — SIGNIFICANT CHANGE UP (ref 22–31)
CREAT SERPL-MCNC: 0.8 MG/DL — SIGNIFICANT CHANGE UP (ref 0.5–1.3)
GLUCOSE SERPL-MCNC: 115 MG/DL — HIGH (ref 70–99)
HCT VFR BLD CALC: 38.3 % — LOW (ref 39–50)
HGB BLD-MCNC: 12.4 G/DL — LOW (ref 13–17)
MAGNESIUM SERPL-MCNC: 1.8 MG/DL — SIGNIFICANT CHANGE UP (ref 1.6–2.6)
MCHC RBC-ENTMCNC: 28.9 PG — SIGNIFICANT CHANGE UP (ref 27–34)
MCHC RBC-ENTMCNC: 32.4 GM/DL — SIGNIFICANT CHANGE UP (ref 32–36)
MCV RBC AUTO: 89.3 FL — SIGNIFICANT CHANGE UP (ref 80–100)
PHOSPHATE SERPL-MCNC: 2.2 MG/DL — LOW (ref 2.5–4.5)
PLATELET # BLD AUTO: 259 K/UL — SIGNIFICANT CHANGE UP (ref 150–400)
POTASSIUM SERPL-MCNC: 3.5 MMOL/L — SIGNIFICANT CHANGE UP (ref 3.5–5.3)
POTASSIUM SERPL-SCNC: 3.5 MMOL/L — SIGNIFICANT CHANGE UP (ref 3.5–5.3)
RBC # BLD: 4.29 M/UL — SIGNIFICANT CHANGE UP (ref 4.2–5.8)
RBC # FLD: 13.1 % — SIGNIFICANT CHANGE UP (ref 10.3–14.5)
SODIUM SERPL-SCNC: 134 MMOL/L — LOW (ref 135–145)
WBC # BLD: 16.73 K/UL — HIGH (ref 3.8–10.5)
WBC # FLD AUTO: 16.73 K/UL — HIGH (ref 3.8–10.5)

## 2019-03-07 PROCEDURE — 99232 SBSQ HOSP IP/OBS MODERATE 35: CPT

## 2019-03-07 RX ORDER — ACETAMINOPHEN 500 MG
1000 TABLET ORAL ONCE
Qty: 0 | Refills: 0 | Status: COMPLETED | OUTPATIENT
Start: 2019-03-07 | End: 2019-03-07

## 2019-03-07 RX ADMIN — OXYCODONE HYDROCHLORIDE 5 MILLIGRAM(S): 5 TABLET ORAL at 15:30

## 2019-03-07 RX ADMIN — DEXTROSE MONOHYDRATE, SODIUM CHLORIDE, AND POTASSIUM CHLORIDE 100 MILLILITER(S): 50; .745; 4.5 INJECTION, SOLUTION INTRAVENOUS at 12:21

## 2019-03-07 RX ADMIN — PIPERACILLIN AND TAZOBACTAM 25 GRAM(S): 4; .5 INJECTION, POWDER, LYOPHILIZED, FOR SOLUTION INTRAVENOUS at 03:37

## 2019-03-07 RX ADMIN — OXYCODONE HYDROCHLORIDE 5 MILLIGRAM(S): 5 TABLET ORAL at 08:00

## 2019-03-07 RX ADMIN — PIPERACILLIN AND TAZOBACTAM 25 GRAM(S): 4; .5 INJECTION, POWDER, LYOPHILIZED, FOR SOLUTION INTRAVENOUS at 12:20

## 2019-03-07 RX ADMIN — Medication 1000 MILLIGRAM(S): at 03:00

## 2019-03-07 RX ADMIN — Medication 975 MILLIGRAM(S): at 15:32

## 2019-03-07 RX ADMIN — ENOXAPARIN SODIUM 40 MILLIGRAM(S): 100 INJECTION SUBCUTANEOUS at 05:24

## 2019-03-07 RX ADMIN — PIPERACILLIN AND TAZOBACTAM 25 GRAM(S): 4; .5 INJECTION, POWDER, LYOPHILIZED, FOR SOLUTION INTRAVENOUS at 19:53

## 2019-03-07 RX ADMIN — Medication 400 MILLIGRAM(S): at 02:28

## 2019-03-07 RX ADMIN — Medication 975 MILLIGRAM(S): at 08:00

## 2019-03-07 RX ADMIN — Medication 975 MILLIGRAM(S): at 21:15

## 2019-03-07 RX ADMIN — Medication 975 MILLIGRAM(S): at 07:30

## 2019-03-07 RX ADMIN — OXYCODONE HYDROCHLORIDE 5 MILLIGRAM(S): 5 TABLET ORAL at 03:39

## 2019-03-07 RX ADMIN — Medication 62.5 MILLIMOLE(S): at 15:03

## 2019-03-07 RX ADMIN — OXYCODONE HYDROCHLORIDE 5 MILLIGRAM(S): 5 TABLET ORAL at 04:59

## 2019-03-07 RX ADMIN — OXYCODONE HYDROCHLORIDE 5 MILLIGRAM(S): 5 TABLET ORAL at 15:00

## 2019-03-07 RX ADMIN — Medication 975 MILLIGRAM(S): at 20:46

## 2019-03-07 RX ADMIN — OXYCODONE HYDROCHLORIDE 5 MILLIGRAM(S): 5 TABLET ORAL at 20:40

## 2019-03-07 RX ADMIN — Medication 975 MILLIGRAM(S): at 15:02

## 2019-03-07 RX ADMIN — OXYCODONE HYDROCHLORIDE 5 MILLIGRAM(S): 5 TABLET ORAL at 07:29

## 2019-03-07 RX ADMIN — OXYCODONE HYDROCHLORIDE 5 MILLIGRAM(S): 5 TABLET ORAL at 20:46

## 2019-03-07 RX ADMIN — DEXTROSE MONOHYDRATE, SODIUM CHLORIDE, AND POTASSIUM CHLORIDE 100 MILLILITER(S): 50; .745; 4.5 INJECTION, SOLUTION INTRAVENOUS at 06:38

## 2019-03-07 NOTE — PROGRESS NOTE ADULT - ATTENDING COMMENTS
Patient seen and examined this AM on rounds  States that he feels better today  afebrile  abd - + mild RLQ tenderness, soft    WBC= 16    - Seems to be getting better, although still with leukocytosis  - Continue IV antibiotics and nonop treatment

## 2019-03-07 NOTE — PROGRESS NOTE ADULT - SUBJECTIVE AND OBJECTIVE BOX
General Surgery Progress Note    SUBJECTIVE:  The patient was seen and examined. No acute events overnight. Still c/o mild abd pain. Denies n/v, cp, sob.   Yesterday: c/o abd pain after clears started, backed down to NPO.  Re-placed on clears this am.    OBJECTIVE:     ** VITAL SIGNS / I&O's **    Vital Signs Last 24 Hrs  T(C): 36.9 (07 Mar 2019 08:56), Max: 37 (06 Mar 2019 14:13)  T(F): 98.5 (07 Mar 2019 08:56), Max: 98.6 (06 Mar 2019 14:13)  HR: 60 (07 Mar 2019 08:56) (60 - 87)  BP: 104/58 (07 Mar 2019 08:56) (99/66 - 118/71)  BP(mean): --  RR: 18 (07 Mar 2019 08:56) (18 - 18)  SpO2: 97% (07 Mar 2019 08:56) (96% - 97%)      06 Mar 2019 07:01  -  07 Mar 2019 07:00  --------------------------------------------------------  IN:    dextrose 5% + sodium chloride 0.45% with potassium chloride 20 mEq/L: 550 mL    IV PiggyBack: 200 mL    lactated ringers.: 1200 mL    Oral Fluid: 240 mL  Total IN: 2190 mL    OUT:  Total OUT: 0 mL    Total NET: 2190 mL      07 Mar 2019 07:01  -  07 Mar 2019 11:23  --------------------------------------------------------  IN:  Total IN: 0 mL    OUT:  Total OUT: 0 mL    Total NET: 0 mL          ** PHYSICAL EXAM **    -- CONSTITUTIONAL: Alert, NAD  -- PULMONARY: non-labored respirations  -- ABDOMEN: soft, non-distended, non-tender  -- NEURO: A&Ox3    ** LABS **                          12.4   16.73 )-----------( 259      ( 07 Mar 2019 10:13 )             38.3     07 Mar 2019 07:17    134    |  99     |  7      ----------------------------<  115    3.5     |  23     |  0.80     Ca    9.1        07 Mar 2019 07:17  Phos  2.2       07 Mar 2019 07:17  Mg     1.8       07 Mar 2019 07:17    TPro  7.5    /  Alb  4.5    /  TBili  0.5    /  DBili  x      /  AST  25     /  ALT  58     /  AlkPhos  64     05 Mar 2019 18:49      CAPILLARY BLOOD GLUCOSE            LIVER FUNCTIONS - ( 05 Mar 2019 18:49 )  Alb: 4.5 g/dL / Pro: 7.5 g/dL / ALK PHOS: 64 U/L / ALT: 58 U/L / AST: 25 U/L / GGT: x             Culture - Urine (collected 06 Mar 2019 02:56)  Source: .Urine Clean Catch (Midstream)  Final Report (06 Mar 2019 21:28):    No growth      Urinalysis Basic - ( 05 Mar 2019 20:23 )    Color: Yellow / Appearance: Slightly Turbid / S.033 / pH: x  Gluc: x / Ketone: Moderate  / Bili: Negative / Urobili: 2 mg/dL   Blood: x / Protein: 30 mg/dL / Nitrite: Negative   Leuk Esterase: Negative / RBC: 4 /hpf / WBC 3 /HPF   Sq Epi: x / Non Sq Epi: 2 /hpf / Bacteria: Negative        MEDICATIONS  (STANDING):  acetaminophen   Tablet .. 975 milliGRAM(s) Oral every 6 hours  dextrose 5% + sodium chloride 0.45% with potassium chloride 20 mEq/L 1000 milliLiter(s) (100 mL/Hr) IV Continuous <Continuous>  enoxaparin Injectable 40 milliGRAM(s) SubCutaneous every 24 hours  influenza   Vaccine 0.5 milliLiter(s) IntraMuscular once  piperacillin/tazobactam IVPB. 3.375 Gram(s) IV Intermittent every 8 hours    MEDICATIONS  (PRN):  oxyCODONE    IR 5 milliGRAM(s) Oral every 4 hours PRN Moderate Pain (4 - 6) General Surgery Progress Note    SUBJECTIVE:  The patient was seen and examined. No acute events overnight. Still c/o mild abd pain. Denies n/v, cp, sob.   Yesterday: c/o abd pain after clears started, backed down to NPO.  Re-placed on clears this am.    OBJECTIVE:     ** VITAL SIGNS / I&O's **    Vital Signs Last 24 Hrs  T(C): 36.9 (07 Mar 2019 08:56), Max: 37 (06 Mar 2019 14:13)  T(F): 98.5 (07 Mar 2019 08:56), Max: 98.6 (06 Mar 2019 14:13)  HR: 60 (07 Mar 2019 08:56) (60 - 87)  BP: 104/58 (07 Mar 2019 08:56) (99/66 - 118/71)  BP(mean): --  RR: 18 (07 Mar 2019 08:56) (18 - 18)  SpO2: 97% (07 Mar 2019 08:56) (96% - 97%)      06 Mar 2019 07:01  -  07 Mar 2019 07:00  --------------------------------------------------------  IN:    dextrose 5% + sodium chloride 0.45% with potassium chloride 20 mEq/L: 550 mL    IV PiggyBack: 200 mL    lactated ringers.: 1200 mL    Oral Fluid: 240 mL  Total IN: 2190 mL    OUT:  Total OUT: 0 mL    Total NET: 2190 mL      07 Mar 2019 07:01  -  07 Mar 2019 11:23  --------------------------------------------------------  IN:  Total IN: 0 mL    OUT:  Total OUT: 0 mL    Total NET: 0 mL          ** PHYSICAL EXAM **    -- CONSTITUTIONAL: Alert, NAD  -- PULMONARY: non-labored respirations  -- ABDOMEN: soft, non-distended, RLQ TTP  -- NEURO: A&Ox3    ** LABS **                          12.4   16.73 )-----------( 259      ( 07 Mar 2019 10:13 )             38.3     07 Mar 2019 07:17    134    |  99     |  7      ----------------------------<  115    3.5     |  23     |  0.80     Ca    9.1        07 Mar 2019 07:17  Phos  2.2       07 Mar 2019 07:17  Mg     1.8       07 Mar 2019 07:17    TPro  7.5    /  Alb  4.5    /  TBili  0.5    /  DBili  x      /  AST  25     /  ALT  58     /  AlkPhos  64     05 Mar 2019 18:49      CAPILLARY BLOOD GLUCOSE            LIVER FUNCTIONS - ( 05 Mar 2019 18:49 )  Alb: 4.5 g/dL / Pro: 7.5 g/dL / ALK PHOS: 64 U/L / ALT: 58 U/L / AST: 25 U/L / GGT: x             Culture - Urine (collected 06 Mar 2019 02:56)  Source: .Urine Clean Catch (Midstream)  Final Report (06 Mar 2019 21:28):    No growth      Urinalysis Basic - ( 05 Mar 2019 20:23 )    Color: Yellow / Appearance: Slightly Turbid / S.033 / pH: x  Gluc: x / Ketone: Moderate  / Bili: Negative / Urobili: 2 mg/dL   Blood: x / Protein: 30 mg/dL / Nitrite: Negative   Leuk Esterase: Negative / RBC: 4 /hpf / WBC 3 /HPF   Sq Epi: x / Non Sq Epi: 2 /hpf / Bacteria: Negative        MEDICATIONS  (STANDING):  acetaminophen   Tablet .. 975 milliGRAM(s) Oral every 6 hours  dextrose 5% + sodium chloride 0.45% with potassium chloride 20 mEq/L 1000 milliLiter(s) (100 mL/Hr) IV Continuous <Continuous>  enoxaparin Injectable 40 milliGRAM(s) SubCutaneous every 24 hours  influenza   Vaccine 0.5 milliLiter(s) IntraMuscular once  piperacillin/tazobactam IVPB. 3.375 Gram(s) IV Intermittent every 8 hours    MEDICATIONS  (PRN):  oxyCODONE    IR 5 milliGRAM(s) Oral every 4 hours PRN Moderate Pain (4 - 6)

## 2019-03-07 NOTE — PROGRESS NOTE ADULT - ASSESSMENT
47yo M p/w abdominal pain x3 days found with perforated appendicitis, admitted for non-op tx with IV abx.    PLAN:  -  pain control prn  -  clears, monitor GIF/tolerance to clears  -  MIVF  -  c/w IV zosyn  -  monitor am labs  -  oob as tolerated  -  lovenox for dvt ppx  -  dispo planning    [facility]  [id number]

## 2019-03-08 LAB
ANION GAP SERPL CALC-SCNC: 12 MMOL/L — SIGNIFICANT CHANGE UP (ref 5–17)
BUN SERPL-MCNC: 5 MG/DL — LOW (ref 7–23)
CALCIUM SERPL-MCNC: 8.5 MG/DL — SIGNIFICANT CHANGE UP (ref 8.4–10.5)
CHLORIDE SERPL-SCNC: 102 MMOL/L — SIGNIFICANT CHANGE UP (ref 96–108)
CO2 SERPL-SCNC: 24 MMOL/L — SIGNIFICANT CHANGE UP (ref 22–31)
CREAT SERPL-MCNC: 0.73 MG/DL — SIGNIFICANT CHANGE UP (ref 0.5–1.3)
GLUCOSE SERPL-MCNC: 109 MG/DL — HIGH (ref 70–99)
HCT VFR BLD CALC: 36 % — LOW (ref 39–50)
HGB BLD-MCNC: 11.7 G/DL — LOW (ref 13–17)
MAGNESIUM SERPL-MCNC: 1.8 MG/DL — SIGNIFICANT CHANGE UP (ref 1.6–2.6)
MCHC RBC-ENTMCNC: 29.5 PG — SIGNIFICANT CHANGE UP (ref 27–34)
MCHC RBC-ENTMCNC: 32.5 GM/DL — SIGNIFICANT CHANGE UP (ref 32–36)
MCV RBC AUTO: 90.7 FL — SIGNIFICANT CHANGE UP (ref 80–100)
PHOSPHATE SERPL-MCNC: 2.6 MG/DL — SIGNIFICANT CHANGE UP (ref 2.5–4.5)
PLATELET # BLD AUTO: 258 K/UL — SIGNIFICANT CHANGE UP (ref 150–400)
POTASSIUM SERPL-MCNC: 3.5 MMOL/L — SIGNIFICANT CHANGE UP (ref 3.5–5.3)
POTASSIUM SERPL-SCNC: 3.5 MMOL/L — SIGNIFICANT CHANGE UP (ref 3.5–5.3)
RBC # BLD: 3.97 M/UL — LOW (ref 4.2–5.8)
RBC # FLD: 12.9 % — SIGNIFICANT CHANGE UP (ref 10.3–14.5)
SODIUM SERPL-SCNC: 138 MMOL/L — SIGNIFICANT CHANGE UP (ref 135–145)
WBC # BLD: 14.7 K/UL — HIGH (ref 3.8–10.5)
WBC # FLD AUTO: 14.7 K/UL — HIGH (ref 3.8–10.5)

## 2019-03-08 PROCEDURE — 99232 SBSQ HOSP IP/OBS MODERATE 35: CPT

## 2019-03-08 RX ORDER — POTASSIUM CHLORIDE 20 MEQ
40 PACKET (EA) ORAL ONCE
Qty: 0 | Refills: 0 | Status: COMPLETED | OUTPATIENT
Start: 2019-03-08 | End: 2019-03-08

## 2019-03-08 RX ORDER — LANOLIN ALCOHOL/MO/W.PET/CERES
3 CREAM (GRAM) TOPICAL ONCE
Qty: 0 | Refills: 0 | Status: COMPLETED | OUTPATIENT
Start: 2019-03-08 | End: 2019-03-08

## 2019-03-08 RX ORDER — MAGNESIUM SULFATE 500 MG/ML
2 VIAL (ML) INJECTION ONCE
Qty: 0 | Refills: 0 | Status: COMPLETED | OUTPATIENT
Start: 2019-03-08 | End: 2019-03-08

## 2019-03-08 RX ADMIN — PIPERACILLIN AND TAZOBACTAM 25 GRAM(S): 4; .5 INJECTION, POWDER, LYOPHILIZED, FOR SOLUTION INTRAVENOUS at 19:56

## 2019-03-08 RX ADMIN — Medication 62.5 MILLIMOLE(S): at 09:53

## 2019-03-08 RX ADMIN — Medication 3 MILLIGRAM(S): at 23:08

## 2019-03-08 RX ADMIN — OXYCODONE HYDROCHLORIDE 5 MILLIGRAM(S): 5 TABLET ORAL at 04:01

## 2019-03-08 RX ADMIN — DEXTROSE MONOHYDRATE, SODIUM CHLORIDE, AND POTASSIUM CHLORIDE 100 MILLILITER(S): 50; .745; 4.5 INJECTION, SOLUTION INTRAVENOUS at 09:53

## 2019-03-08 RX ADMIN — Medication 975 MILLIGRAM(S): at 19:56

## 2019-03-08 RX ADMIN — OXYCODONE HYDROCHLORIDE 5 MILLIGRAM(S): 5 TABLET ORAL at 01:28

## 2019-03-08 RX ADMIN — Medication 975 MILLIGRAM(S): at 13:59

## 2019-03-08 RX ADMIN — Medication 975 MILLIGRAM(S): at 14:30

## 2019-03-08 RX ADMIN — ENOXAPARIN SODIUM 40 MILLIGRAM(S): 100 INJECTION SUBCUTANEOUS at 03:50

## 2019-03-08 RX ADMIN — Medication 975 MILLIGRAM(S): at 20:31

## 2019-03-08 RX ADMIN — Medication 975 MILLIGRAM(S): at 08:24

## 2019-03-08 RX ADMIN — OXYCODONE HYDROCHLORIDE 5 MILLIGRAM(S): 5 TABLET ORAL at 22:52

## 2019-03-08 RX ADMIN — Medication 50 GRAM(S): at 08:31

## 2019-03-08 RX ADMIN — OXYCODONE HYDROCHLORIDE 5 MILLIGRAM(S): 5 TABLET ORAL at 08:54

## 2019-03-08 RX ADMIN — OXYCODONE HYDROCHLORIDE 5 MILLIGRAM(S): 5 TABLET ORAL at 00:53

## 2019-03-08 RX ADMIN — OXYCODONE HYDROCHLORIDE 5 MILLIGRAM(S): 5 TABLET ORAL at 23:23

## 2019-03-08 RX ADMIN — PIPERACILLIN AND TAZOBACTAM 25 GRAM(S): 4; .5 INJECTION, POWDER, LYOPHILIZED, FOR SOLUTION INTRAVENOUS at 12:14

## 2019-03-08 RX ADMIN — Medication 40 MILLIEQUIVALENT(S): at 08:30

## 2019-03-08 RX ADMIN — OXYCODONE HYDROCHLORIDE 5 MILLIGRAM(S): 5 TABLET ORAL at 04:16

## 2019-03-08 RX ADMIN — OXYCODONE HYDROCHLORIDE 5 MILLIGRAM(S): 5 TABLET ORAL at 08:30

## 2019-03-08 RX ADMIN — Medication 975 MILLIGRAM(S): at 04:16

## 2019-03-08 RX ADMIN — Medication 975 MILLIGRAM(S): at 03:49

## 2019-03-08 RX ADMIN — PIPERACILLIN AND TAZOBACTAM 25 GRAM(S): 4; .5 INJECTION, POWDER, LYOPHILIZED, FOR SOLUTION INTRAVENOUS at 03:49

## 2019-03-08 RX ADMIN — Medication 975 MILLIGRAM(S): at 08:54

## 2019-03-08 NOTE — PROGRESS NOTE ADULT - SUBJECTIVE AND OBJECTIVE BOX
General Surgery Progress Note    SUBJECTIVE:  The patient was seen and examined. No acute events overnight. Temo clears. Still c/o abd pain but markedly reduced.    OBJECTIVE:     ** VITAL SIGNS / I&O's **    Vital Signs Last 24 Hrs  T(C): 36.8 (07 Mar 2019 21:25), Max: 36.9 (07 Mar 2019 08:56)  T(F): 98.2 (07 Mar 2019 21:25), Max: 98.5 (07 Mar 2019 08:56)  HR: 85 (07 Mar 2019 21:25) (60 - 85)  BP: 105/76 (07 Mar 2019 21:25) (100/65 - 120/77)  BP(mean): --  RR: 18 (07 Mar 2019 21:25) (18 - 18)  SpO2: 98% (07 Mar 2019 21:25) (97% - 98%)      06 Mar 2019 07:01  -  07 Mar 2019 07:00  --------------------------------------------------------  IN:    dextrose 5% + sodium chloride 0.45% with potassium chloride 20 mEq/L: 550 mL    IV PiggyBack: 200 mL    lactated ringers.: 1200 mL    Oral Fluid: 240 mL  Total IN: 2190 mL    OUT:  Total OUT: 0 mL    Total NET: 2190 mL      07 Mar 2019 07:01  -  08 Mar 2019 04:25  --------------------------------------------------------  IN:    dextrose 5% + sodium chloride 0.45% with potassium chloride 20 mEq/L: 1200 mL    IV PiggyBack: 250 mL    Oral Fluid: 480 mL    Solution: 200 mL  Total IN: 2130 mL    OUT:    Voided: 2 mL  Total OUT: 2 mL    Total NET: 2128 mL          ** PHYSICAL EXAM **    -- CONSTITUTIONAL: Alert, NAD  -- PULMONARY: non-labored respirations  -- ABDOMEN: soft, nondistended, tender in RLQ      ** LABS **                          12.4   16.73 )-----------( 259      ( 07 Mar 2019 10:13 )             38.3     07 Mar 2019 07:17    134    |  99     |  7      ----------------------------<  115    3.5     |  23     |  0.80     Ca    9.1        07 Mar 2019 07:17  Phos  2.2       07 Mar 2019 07:17  Mg     1.8       07 Mar 2019 07:17        CAPILLARY BLOOD GLUCOSE                Culture - Urine (collected 06 Mar 2019 02:56)  Source: .Urine Clean Catch (Midstream)  Final Report (06 Mar 2019 21:28):    No growth          MEDICATIONS  (STANDING):  acetaminophen   Tablet .. 975 milliGRAM(s) Oral every 6 hours  dextrose 5% + sodium chloride 0.45% with potassium chloride 20 mEq/L 1000 milliLiter(s) (100 mL/Hr) IV Continuous <Continuous>  enoxaparin Injectable 40 milliGRAM(s) SubCutaneous every 24 hours  influenza   Vaccine 0.5 milliLiter(s) IntraMuscular once  piperacillin/tazobactam IVPB. 3.375 Gram(s) IV Intermittent every 8 hours    MEDICATIONS  (PRN):  oxyCODONE    IR 5 milliGRAM(s) Oral every 4 hours PRN Moderate Pain (4 - 6)

## 2019-03-08 NOTE — PROGRESS NOTE ADULT - ASSESSMENT
49yo M p/w abdominal pain x3 days found with perforated appendicitis, admitted for non-op tx with IV abx.    PLAN:  -  pain control prn  -  clears, monitor GIF/tolerance to clears; poss advance to regular diet  -  MIVF, poss IVL if advanced to reg  -  c/w IV zosyn  -  monitor am labs  -  oob as tolerated  -  lovenox for dvt ppx  -  dispo planning    [facility]  [id number]

## 2019-03-08 NOTE — PROGRESS NOTE ADULT - ATTENDING COMMENTS
Patient seen and examined on 3/8/19 AM rounds  Still with RLQ pain, but states that it is slowly improving  Non toxic appearing  vitals stable  + RLQ tenderness, no peritoneal signs  WBC= 16 ->14    - Continue antibiotics and non-operative tx of perforated appendicitis

## 2019-03-09 ENCOUNTER — TRANSCRIPTION ENCOUNTER (OUTPATIENT)
Age: 49
End: 2019-03-09

## 2019-03-09 VITALS
OXYGEN SATURATION: 94 % | RESPIRATION RATE: 18 BRPM | TEMPERATURE: 100 F | HEART RATE: 74 BPM | DIASTOLIC BLOOD PRESSURE: 73 MMHG | SYSTOLIC BLOOD PRESSURE: 119 MMHG

## 2019-03-09 LAB
ANION GAP SERPL CALC-SCNC: 14 MMOL/L — SIGNIFICANT CHANGE UP (ref 5–17)
BUN SERPL-MCNC: 5 MG/DL — LOW (ref 7–23)
CALCIUM SERPL-MCNC: 9 MG/DL — SIGNIFICANT CHANGE UP (ref 8.4–10.5)
CHLORIDE SERPL-SCNC: 102 MMOL/L — SIGNIFICANT CHANGE UP (ref 96–108)
CO2 SERPL-SCNC: 23 MMOL/L — SIGNIFICANT CHANGE UP (ref 22–31)
CREAT SERPL-MCNC: 0.78 MG/DL — SIGNIFICANT CHANGE UP (ref 0.5–1.3)
GLUCOSE SERPL-MCNC: 91 MG/DL — SIGNIFICANT CHANGE UP (ref 70–99)
HCT VFR BLD CALC: 37.1 % — LOW (ref 39–50)
HGB BLD-MCNC: 12 G/DL — LOW (ref 13–17)
MAGNESIUM SERPL-MCNC: 1.9 MG/DL — SIGNIFICANT CHANGE UP (ref 1.6–2.6)
MCHC RBC-ENTMCNC: 29.1 PG — SIGNIFICANT CHANGE UP (ref 27–34)
MCHC RBC-ENTMCNC: 32.3 GM/DL — SIGNIFICANT CHANGE UP (ref 32–36)
MCV RBC AUTO: 90 FL — SIGNIFICANT CHANGE UP (ref 80–100)
PHOSPHATE SERPL-MCNC: 3.5 MG/DL — SIGNIFICANT CHANGE UP (ref 2.5–4.5)
PLATELET # BLD AUTO: 320 K/UL — SIGNIFICANT CHANGE UP (ref 150–400)
POTASSIUM SERPL-MCNC: 3.8 MMOL/L — SIGNIFICANT CHANGE UP (ref 3.5–5.3)
POTASSIUM SERPL-SCNC: 3.8 MMOL/L — SIGNIFICANT CHANGE UP (ref 3.5–5.3)
RBC # BLD: 4.12 M/UL — LOW (ref 4.2–5.8)
RBC # FLD: 13 % — SIGNIFICANT CHANGE UP (ref 10.3–14.5)
SODIUM SERPL-SCNC: 139 MMOL/L — SIGNIFICANT CHANGE UP (ref 135–145)
WBC # BLD: 11.82 K/UL — HIGH (ref 3.8–10.5)
WBC # FLD AUTO: 11.82 K/UL — HIGH (ref 3.8–10.5)

## 2019-03-09 PROCEDURE — 82330 ASSAY OF CALCIUM: CPT

## 2019-03-09 PROCEDURE — 99232 SBSQ HOSP IP/OBS MODERATE 35: CPT

## 2019-03-09 PROCEDURE — 85014 HEMATOCRIT: CPT

## 2019-03-09 PROCEDURE — 99285 EMERGENCY DEPT VISIT HI MDM: CPT | Mod: 25

## 2019-03-09 PROCEDURE — 82435 ASSAY OF BLOOD CHLORIDE: CPT

## 2019-03-09 PROCEDURE — 81001 URINALYSIS AUTO W/SCOPE: CPT

## 2019-03-09 PROCEDURE — 82803 BLOOD GASES ANY COMBINATION: CPT

## 2019-03-09 PROCEDURE — 85027 COMPLETE CBC AUTOMATED: CPT

## 2019-03-09 PROCEDURE — 87086 URINE CULTURE/COLONY COUNT: CPT

## 2019-03-09 PROCEDURE — 80053 COMPREHEN METABOLIC PANEL: CPT

## 2019-03-09 PROCEDURE — 83735 ASSAY OF MAGNESIUM: CPT

## 2019-03-09 PROCEDURE — 74177 CT ABD & PELVIS W/CONTRAST: CPT

## 2019-03-09 PROCEDURE — 96374 THER/PROPH/DIAG INJ IV PUSH: CPT | Mod: XU

## 2019-03-09 PROCEDURE — 83690 ASSAY OF LIPASE: CPT

## 2019-03-09 PROCEDURE — 83605 ASSAY OF LACTIC ACID: CPT

## 2019-03-09 PROCEDURE — 84295 ASSAY OF SERUM SODIUM: CPT

## 2019-03-09 PROCEDURE — 82947 ASSAY GLUCOSE BLOOD QUANT: CPT

## 2019-03-09 PROCEDURE — 80048 BASIC METABOLIC PNL TOTAL CA: CPT

## 2019-03-09 PROCEDURE — 96375 TX/PRO/DX INJ NEW DRUG ADDON: CPT

## 2019-03-09 PROCEDURE — 84132 ASSAY OF SERUM POTASSIUM: CPT

## 2019-03-09 PROCEDURE — 84100 ASSAY OF PHOSPHORUS: CPT

## 2019-03-09 RX ORDER — OXYCODONE HYDROCHLORIDE 5 MG/1
1 TABLET ORAL
Qty: 12 | Refills: 0
Start: 2019-03-09 | End: 2019-03-11

## 2019-03-09 RX ORDER — ACETAMINOPHEN 500 MG
3 TABLET ORAL
Qty: 0 | Refills: 0 | DISCHARGE
Start: 2019-03-09

## 2019-03-09 RX ADMIN — PIPERACILLIN AND TAZOBACTAM 25 GRAM(S): 4; .5 INJECTION, POWDER, LYOPHILIZED, FOR SOLUTION INTRAVENOUS at 03:42

## 2019-03-09 RX ADMIN — Medication 975 MILLIGRAM(S): at 08:52

## 2019-03-09 RX ADMIN — Medication 975 MILLIGRAM(S): at 02:00

## 2019-03-09 RX ADMIN — Medication 975 MILLIGRAM(S): at 01:56

## 2019-03-09 RX ADMIN — Medication 975 MILLIGRAM(S): at 08:22

## 2019-03-09 RX ADMIN — ENOXAPARIN SODIUM 40 MILLIGRAM(S): 100 INJECTION SUBCUTANEOUS at 03:42

## 2019-03-09 RX ADMIN — PIPERACILLIN AND TAZOBACTAM 25 GRAM(S): 4; .5 INJECTION, POWDER, LYOPHILIZED, FOR SOLUTION INTRAVENOUS at 12:11

## 2019-03-09 RX ADMIN — OXYCODONE HYDROCHLORIDE 5 MILLIGRAM(S): 5 TABLET ORAL at 08:52

## 2019-03-09 RX ADMIN — OXYCODONE HYDROCHLORIDE 5 MILLIGRAM(S): 5 TABLET ORAL at 08:22

## 2019-03-09 RX ADMIN — OXYCODONE HYDROCHLORIDE 5 MILLIGRAM(S): 5 TABLET ORAL at 03:43

## 2019-03-09 RX ADMIN — OXYCODONE HYDROCHLORIDE 5 MILLIGRAM(S): 5 TABLET ORAL at 14:33

## 2019-03-09 RX ADMIN — Medication 975 MILLIGRAM(S): at 14:33

## 2019-03-09 RX ADMIN — OXYCODONE HYDROCHLORIDE 5 MILLIGRAM(S): 5 TABLET ORAL at 04:12

## 2019-03-09 NOTE — DISCHARGE NOTE PROVIDER - NSDCCPCAREPLAN_GEN_ALL_CORE_FT
PRINCIPAL DISCHARGE DIAGNOSIS  Problem: Appendicitis with perforation  Assessment and Plan of Treatment: treated with IV zosyn, PRINCIPAL DISCHARGE DIAGNOSIS  Problem: Appendicitis with perforation  Assessment and Plan of Treatment: treated with IV zosyn 4 day course, IV fluids, and conservative feeding until inflammation resolved. Non-op treatment plan

## 2019-03-09 NOTE — DISCHARGE NOTE PROVIDER - NSDCFUADDINST_GEN_ALL_CORE_FT
FOLLOW-UP:  -   Please call [phone] to schedule a follow-up appointment with [facility],  to see Dr. Moreno in 2 weeks. At that time you can discuss future therapeutic options and whether or not it would be a good idea to undergo surgery in the future.    When should I call my doctor?  -  If you have an oral temperature over 100.4 degrees.  -  If you have severe or increased pain that is not controlled by taking the discharge pain medications as prescribed.  -  If you have any side effects to medications; such as, rash, nausea, headache, or vomiting.  -  If you have persistent nausea/vomiting.  -  If you have diarrhea that lasts more than 3 days.

## 2019-03-09 NOTE — DISCHARGE NOTE PROVIDER - HOSPITAL COURSE
Mr. Barnes was admitted 3/5 with a diagnosis of perforated acute appendicitis. On CTAP 3/5, he had a perforated appendix with a 1.6x1.6 cm fluid collection adjacent to the tip, and a WBC to 20. He was started on IV zosyn and given IV fluids. The following day he was trialed on CLD but failed with severe abdominal pain, so was made NPO again overnight. On 3/7 he tolerated CLD and was advanced to regular, and tolerated. He endorsed diminished pain and no nausea.        On 3/9 the pt was deemed stable for discharge, with a WBC decreased to 11. He is ambulating, tolerating a regular diet, and his pain has been well-controlled on an oral regimen. He will be discharged to home and will follow-up with Dr. Moreno in clinic in 2 weeks post-discharge, to discuss plans for future operation. Mr. Barnes was admitted 3/5 with a diagnosis of perforated acute appendicitis. On CTAP 3/5, he had a perforated appendix with a 1.6x1.6 cm fluid collection adjacent to the tip, and a WBC to 20. He was started on IV zosyn and given IV fluids. The following day he was trialed on CLD but failed with severe abdominal pain, so was made NPO again overnight. On 3/7 he tolerated CLD and was advanced to regular, and tolerated. He endorsed diminished pain and no nausea.        On 3/9 the pt was deemed stable for discharge, with a WBC decreased to 11. He is ambulating, tolerating a regular diet, and his pain has been well-controlled on an oral regimen. He will be discharged to home and will follow-up with Dr. Moreno in clinic in 2 weeks post-discharge, to discuss plans for future operation. He has two more doses of oral augmentin to complete outpatient, to complete a 4 day course.

## 2019-03-09 NOTE — DISCHARGE NOTE PROVIDER - CARE PROVIDER_API CALL
Lalo Moreno (MD)  Surgery; Surgical Critical Care  [address] [facility] [address] [facility], [address] [address]  Phone: [phone]  Fax: [phone]  Follow Up Time:

## 2019-03-09 NOTE — PROGRESS NOTE ADULT - ASSESSMENT
49yo M p/w abdominal pain x3 days found with perforated appendicitis, admitted for non-op tx with IV abx x4days. WBC today 11    PLAN:  -  c/w reg diet  -  c/w po augmentin to complete 4 day course total  -  oob as tolerated    DISPO:  home today    [facility]  [id number]

## 2019-03-09 NOTE — PROGRESS NOTE ADULT - SUBJECTIVE AND OBJECTIVE BOX
[facility] DAILY PROGRESS NOTE    SUBJECTIVE:  -  tolerating regular diet  -  no pain anymore    OBJECTIVE:    Vital Signs Last 24 Hrs  T(C): 37.1 (09 Mar 2019 09:53), Max: 37.2 (08 Mar 2019 21:47)  T(F): 98.7 (09 Mar 2019 09:53), Max: 98.9 (08 Mar 2019 21:47)  HR: 69 (09 Mar 2019 09:53) (60 - 72)  BP: 111/72 (09 Mar 2019 09:53) (92/55 - 111/72)  BP(mean): --  RR: 18 (09 Mar 2019 09:53) (18 - 18)  SpO2: 94% (09 Mar 2019 09:53) (94% - 96%)    I&O's Detail    08 Mar 2019 07:01  -  09 Mar 2019 07:00  --------------------------------------------------------  IN:    IV PiggyBack: 300 mL    Oral Fluid: 1560 mL    Solution: 300 mL  Total IN: 2160 mL    OUT:  Total OUT: 0 mL    Total NET: 2160 mL      09 Mar 2019 06:01  -  09 Mar 2019 13:07  --------------------------------------------------------  IN:    Oral Fluid: 340 mL  Total IN: 340 mL    OUT:  Total OUT: 0 mL    Total NET: 340 mL        LABS:                        12.0   11.82 )-----------( 320      ( 09 Mar 2019 10:28 )             37.1     03-09    139  |  102  |  5<L>  ----------------------------<  91  3.8   |  23  |  0.78    Ca    9.0      09 Mar 2019 07:28  Phos  3.5     03-09  Mg     1.9     03-09      EXAM:  Gen:       alert, in NAD  Lungs:       unlabored breathing  CV:       regular rate, rhythm  Abd:       nondistended, nontender, soft

## 2019-03-09 NOTE — DISCHARGE NOTE NURSING/CASE MANAGEMENT/SOCIAL WORK - NSDCDPATPORTLINK_GEN_ALL_CORE
You can access the [facility] Patient Portal, offered by [facility], by registering with the following website: [email or web address]

## 2019-03-09 NOTE — PROGRESS NOTE ADULT - ATTENDING COMMENTS
Patient seen and examined  Doing well  Pain much improved  Tolerating diet  Afebrile  abd - mild RLQ tenderness, soft    WBC 14 ->11    - will d/c home on oral antibiotics

## 2019-03-13 ENCOUNTER — APPOINTMENT (OUTPATIENT)
Dept: TRAUMA SURGERY | Facility: CLINIC | Age: 49
End: 2019-03-13

## 2019-06-06 ENCOUNTER — APPOINTMENT (OUTPATIENT)
Dept: TRAUMA SURGERY | Facility: CLINIC | Age: 49
End: 2019-06-06
Payer: MEDICAID

## 2019-06-06 VITALS
TEMPERATURE: 98.1 F | HEIGHT: 72 IN | DIASTOLIC BLOOD PRESSURE: 78 MMHG | WEIGHT: 180 LBS | BODY MASS INDEX: 24.38 KG/M2 | HEART RATE: 78 BPM | SYSTOLIC BLOOD PRESSURE: 120 MMHG

## 2019-06-06 DIAGNOSIS — Z87.19 PERSONAL HISTORY OF OTHER DISEASES OF THE DIGESTIVE SYSTEM: ICD-10-CM

## 2019-06-06 DIAGNOSIS — Z09 ENCOUNTER FOR FOLLOW-UP EXAMINATION AFTER COMPLETED TREATMENT FOR CONDITIONS OTHER THAN MALIGNANT NEOPLASM: ICD-10-CM

## 2019-06-06 PROCEDURE — 99211 OFF/OP EST MAY X REQ PHY/QHP: CPT

## 2019-06-06 NOTE — HISTORY OF PRESENT ILLNESS
[de-identified] :  was admitted in March for perforated appendicitis which was managed nonoperatively. He is now feeling well, eating normally, having normal bowel function. He is here to discuss further management.\par \par I discussed with  that I have reviewed his CT scan, which does not show an appendicolith, and that this means that his risk of recurrent appendicitis approaches that of the general population. I explained that he is not at higher risk of complications from another episode of appendicitis, if it were to occur. I explained that if he had an appendicolith, I would recommend surgery to remove the appendix, because in that case the risk of recurrence is higher. I explained that if he does not want surgery, he should undergo colonoscopy to ensure that there is no cecal or appendiceal orifice lesion which caused his appendicitis, although the risk of this is very low.  stated that at this time, he does not want to have surgery, and he will follow up with GI for colonoscopy.

## 2019-07-02 ENCOUNTER — INPATIENT (INPATIENT)
Facility: HOSPITAL | Age: 49
LOS: 1 days | Discharge: HOME | DRG: 871 | End: 2019-07-04
Attending: SURGERY | Admitting: SURGERY
Payer: MEDICAID

## 2019-07-02 VITALS
HEIGHT: 72 IN | OXYGEN SATURATION: 96 % | HEART RATE: 93 BPM | DIASTOLIC BLOOD PRESSURE: 73 MMHG | RESPIRATION RATE: 20 BRPM | SYSTOLIC BLOOD PRESSURE: 105 MMHG | TEMPERATURE: 98 F | WEIGHT: 179.9 LBS

## 2019-07-02 DIAGNOSIS — K35.32 ACUTE APPENDICITIS WITH PERFORATION, LOCALIZED PERITONITIS, AND GANGRENE, WITHOUT ABSCESS: ICD-10-CM

## 2019-07-02 LAB
ALBUMIN SERPL ELPH-MCNC: 4 G/DL — SIGNIFICANT CHANGE UP (ref 3.3–5)
ALP SERPL-CCNC: 67 U/L — SIGNIFICANT CHANGE UP (ref 40–120)
ALT FLD-CCNC: 14 U/L — SIGNIFICANT CHANGE UP (ref 10–45)
ANION GAP SERPL CALC-SCNC: 13 MMOL/L — SIGNIFICANT CHANGE UP (ref 5–17)
APPEARANCE UR: CLEAR — SIGNIFICANT CHANGE UP
APTT BLD: 29.8 SEC — SIGNIFICANT CHANGE UP (ref 27.5–36.3)
AST SERPL-CCNC: 9 U/L — LOW (ref 10–40)
BACTERIA # UR AUTO: NEGATIVE — SIGNIFICANT CHANGE UP
BILIRUB SERPL-MCNC: 0.8 MG/DL — SIGNIFICANT CHANGE UP (ref 0.2–1.2)
BILIRUB UR-MCNC: NEGATIVE — SIGNIFICANT CHANGE UP
BUN SERPL-MCNC: 12 MG/DL — SIGNIFICANT CHANGE UP (ref 7–23)
CALCIUM SERPL-MCNC: 9.7 MG/DL — SIGNIFICANT CHANGE UP (ref 8.4–10.5)
CHLORIDE SERPL-SCNC: 97 MMOL/L — SIGNIFICANT CHANGE UP (ref 96–108)
CO2 SERPL-SCNC: 24 MMOL/L — SIGNIFICANT CHANGE UP (ref 22–31)
COLOR SPEC: YELLOW — SIGNIFICANT CHANGE UP
CREAT SERPL-MCNC: 0.99 MG/DL — SIGNIFICANT CHANGE UP (ref 0.5–1.3)
DIFF PNL FLD: ABNORMAL
EPI CELLS # UR: 0 /HPF — SIGNIFICANT CHANGE UP
GAS PNL BLDV: SIGNIFICANT CHANGE UP
GLUCOSE SERPL-MCNC: 107 MG/DL — HIGH (ref 70–99)
GLUCOSE UR QL: NEGATIVE — SIGNIFICANT CHANGE UP
HCT VFR BLD CALC: 43.1 % — SIGNIFICANT CHANGE UP (ref 39–50)
HGB BLD-MCNC: 14.7 G/DL — SIGNIFICANT CHANGE UP (ref 13–17)
HYALINE CASTS # UR AUTO: 0 /LPF — SIGNIFICANT CHANGE UP (ref 0–7)
INR BLD: 1.11 RATIO — SIGNIFICANT CHANGE UP (ref 0.88–1.16)
KETONES UR-MCNC: NEGATIVE — SIGNIFICANT CHANGE UP
LEUKOCYTE ESTERASE UR-ACNC: NEGATIVE — SIGNIFICANT CHANGE UP
LIDOCAIN IGE QN: 14 U/L — SIGNIFICANT CHANGE UP (ref 7–60)
LYMPHOCYTES # BLD AUTO: 1.8 K/UL — SIGNIFICANT CHANGE UP (ref 1–3.3)
LYMPHOCYTES # BLD AUTO: 16 % — SIGNIFICANT CHANGE UP (ref 13–44)
MCHC RBC-ENTMCNC: 30.7 PG — SIGNIFICANT CHANGE UP (ref 27–34)
MCHC RBC-ENTMCNC: 34.1 GM/DL — SIGNIFICANT CHANGE UP (ref 32–36)
MCV RBC AUTO: 90 FL — SIGNIFICANT CHANGE UP (ref 80–100)
MONOCYTES # BLD AUTO: 1.9 K/UL — HIGH (ref 0–0.9)
MONOCYTES NFR BLD AUTO: 13 % — SIGNIFICANT CHANGE UP (ref 2–14)
NEUTROPHILS # BLD AUTO: 12 K/UL — HIGH (ref 1.8–7.4)
NEUTROPHILS NFR BLD AUTO: 71 % — SIGNIFICANT CHANGE UP (ref 43–77)
NITRITE UR-MCNC: NEGATIVE — SIGNIFICANT CHANGE UP
PH UR: 6 — SIGNIFICANT CHANGE UP (ref 5–8)
PLATELET # BLD AUTO: 332 K/UL — SIGNIFICANT CHANGE UP (ref 150–400)
POTASSIUM SERPL-MCNC: 4.1 MMOL/L — SIGNIFICANT CHANGE UP (ref 3.5–5.3)
POTASSIUM SERPL-SCNC: 4.1 MMOL/L — SIGNIFICANT CHANGE UP (ref 3.5–5.3)
PROT SERPL-MCNC: 7.3 G/DL — SIGNIFICANT CHANGE UP (ref 6–8.3)
PROT UR-MCNC: NEGATIVE — SIGNIFICANT CHANGE UP
PROTHROM AB SERPL-ACNC: 12.8 SEC — SIGNIFICANT CHANGE UP (ref 10–12.9)
RBC # BLD: 4.79 M/UL — SIGNIFICANT CHANGE UP (ref 4.2–5.8)
RBC # FLD: 13.2 % — SIGNIFICANT CHANGE UP (ref 10.3–14.5)
RBC CASTS # UR COMP ASSIST: 9 /HPF — HIGH (ref 0–4)
SODIUM SERPL-SCNC: 134 MMOL/L — LOW (ref 135–145)
SP GR SPEC: 1.01 — SIGNIFICANT CHANGE UP (ref 1.01–1.02)
UROBILINOGEN FLD QL: NEGATIVE — SIGNIFICANT CHANGE UP
WBC # BLD: 15.8 K/UL — HIGH (ref 3.8–10.5)
WBC # FLD AUTO: 15.8 K/UL — HIGH (ref 3.8–10.5)
WBC UR QL: 1 /HPF — SIGNIFICANT CHANGE UP (ref 0–5)

## 2019-07-02 PROCEDURE — 99285 EMERGENCY DEPT VISIT HI MDM: CPT

## 2019-07-02 PROCEDURE — 74177 CT ABD & PELVIS W/CONTRAST: CPT | Mod: 26

## 2019-07-02 PROCEDURE — 99221 1ST HOSP IP/OBS SF/LOW 40: CPT

## 2019-07-02 RX ORDER — SODIUM CHLORIDE 9 MG/ML
1000 INJECTION INTRAMUSCULAR; INTRAVENOUS; SUBCUTANEOUS ONCE
Refills: 0 | Status: COMPLETED | OUTPATIENT
Start: 2019-07-02 | End: 2019-07-02

## 2019-07-02 RX ORDER — PIPERACILLIN AND TAZOBACTAM 4; .5 G/20ML; G/20ML
3.38 INJECTION, POWDER, LYOPHILIZED, FOR SOLUTION INTRAVENOUS EVERY 8 HOURS
Refills: 0 | Status: DISCONTINUED | OUTPATIENT
Start: 2019-07-02 | End: 2019-07-04

## 2019-07-02 RX ORDER — MORPHINE SULFATE 50 MG/1
4 CAPSULE, EXTENDED RELEASE ORAL ONCE
Refills: 0 | Status: DISCONTINUED | OUTPATIENT
Start: 2019-07-02 | End: 2019-07-02

## 2019-07-02 RX ORDER — PIPERACILLIN AND TAZOBACTAM 4; .5 G/20ML; G/20ML
3.38 INJECTION, POWDER, LYOPHILIZED, FOR SOLUTION INTRAVENOUS ONCE
Refills: 0 | Status: COMPLETED | OUTPATIENT
Start: 2019-07-02 | End: 2019-07-02

## 2019-07-02 RX ORDER — SODIUM CHLORIDE 9 MG/ML
1000 INJECTION, SOLUTION INTRAVENOUS
Refills: 0 | Status: DISCONTINUED | OUTPATIENT
Start: 2019-07-02 | End: 2019-07-03

## 2019-07-02 RX ADMIN — PIPERACILLIN AND TAZOBACTAM 200 GRAM(S): 4; .5 INJECTION, POWDER, LYOPHILIZED, FOR SOLUTION INTRAVENOUS at 22:11

## 2019-07-02 RX ADMIN — MORPHINE SULFATE 4 MILLIGRAM(S): 50 CAPSULE, EXTENDED RELEASE ORAL at 21:07

## 2019-07-02 RX ADMIN — MORPHINE SULFATE 4 MILLIGRAM(S): 50 CAPSULE, EXTENDED RELEASE ORAL at 23:13

## 2019-07-02 RX ADMIN — MORPHINE SULFATE 4 MILLIGRAM(S): 50 CAPSULE, EXTENDED RELEASE ORAL at 23:12

## 2019-07-02 RX ADMIN — SODIUM CHLORIDE 1000 MILLILITER(S): 9 INJECTION INTRAMUSCULAR; INTRAVENOUS; SUBCUTANEOUS at 19:22

## 2019-07-02 RX ADMIN — SODIUM CHLORIDE 1000 MILLILITER(S): 9 INJECTION INTRAMUSCULAR; INTRAVENOUS; SUBCUTANEOUS at 16:43

## 2019-07-02 NOTE — H&P ADULT - ASSESSMENT
48M presenting with re-demonstrated perforated appendicitis.     - Admit to Surgery, Dr. Abdalla.   - Nil Per Os, continue IV Fluids.   - Continue IV abx.   - Serial abdominal exams to evaluate for worsening peritonitis.     Trauma Surgery Pager #[phone]

## 2019-07-02 NOTE — ED PROVIDER NOTE - OBJECTIVE STATEMENT
Smith Hogan MD: 49 yo M [facility] perforated appendicitis 03/2019 p/w sudden onset of right flank and RLQ pain with n/v/d started yesterday. Reports no aggravating factor but report mild relief from tylenol and motrin. Pt describes the pain as a 6/10 non rad sharp pain. Pt report night sweats but denies fever, cp, sob, constipation, blood in urine or stool, dysuria.   Pt state that he never had surgery for the perf appendix after discussing with his surgeon Dr. Fregoso

## 2019-07-02 NOTE — ED ADULT NURSE NOTE - OBJECTIVE STATEMENT
48 year old male Hx of high cholesterol presents to the ED complaining of RLQ pain, as per Pt he had appendicitis in March 2019 and was given IV and PO antibiotics and sent home with appendix intact. Pt states that for the last few days he's had an pain the RLQ "It feels like t did when I had appendicitis". Pt is A&O x 4, VSS, afebrile, ambulating independently. Pt denies fever, chills, NVD, SOB, or chest pain. IV placed, labs drawn, bed in low position, safety measures in place. sister at bedside, 20G IV placed in right forearm.

## 2019-07-02 NOTE — ED PROVIDER NOTE - ATTENDING CONTRIBUTION TO CARE
Attending MD Costa: I personally have seen and examined this patient.  Resident note reviewed and agree on plan of care and except where noted.  See below for details.     Seen in [place], accompanied by big sister  Dr. Moreno     48M with PMH/PSH including perforated appendicitis in March, ?HLD presents to the ED with RLQ pain.  Reports pain started suddenly yesterday morning.  Reports since then the pain has been constant, sharp, stabbing over the lateral aspect of RLQ.  Reports decreased po intake. Reports took Tylenol overnight with improvement, did not resolve.  Reports Tylenol and Motrin at 2pm.  Reports "cold sweats".  Denies fevers, chills.  Reports yesterday morning had emesis, gastric content, nonbloody.  Reports diarrhea, 2-3 episodes, soft stools, denies blood in stools.  Denies chest pain, shortness of breath, palpitations. Denies dysuria, hematuria, change in urinary habits including frequency, urgency. Denies testicular pain.  Reports urine appears "dark yellow from dehydration".  FHX sister MI at age 42, mother s/p bypass, father s/p stents, denies history of cancer, grandmother ovarian cancer.  Reports +tobacco, denies drugs, reports EtOH 2-3 times a week, 3-4 cocktails at each event.  On exam, NAD, head NCAT, PERRL, FROM at neck, no tenderness to midline palpation, no stepoffs along length of spine, lungs CTAB with good inspiratory effort, +S1S2, no m/r/g, abdomen soft with +BS, +tenderness to RLQ extending laterally, no rebound, no guarding, neg Rovsings, ND, no CVAT, moving all extremities with 5/5 strength bilateral upper and lower extremities, good and equal  strength bilaterally; A/P: 48M with RLQ pain, in setting of perf'ed appy in March, will obtain labs, keep npo, obtain CT A/P with po and IV, pain control, reassess Attending MD Costa: I personally have seen and examined this patient.  Resident note reviewed and agree on plan of care and except where noted.  See below for details.     Seen in [place], accompanied by big sister  Dr. Moreno     48M with PMH/PSH including perforated appendicitis in March, ?HLD presents to the ED with RLQ pain.  Reports pain started suddenly yesterday morning.  Reports since then the pain has been constant, sharp, stabbing over the lateral aspect of RLQ.  Reports decreased po intake. Reports took Tylenol overnight with improvement, did not resolve.  Reports Tylenol and Motrin at 2pm.  Reports "cold sweats".  Denies fevers, chills.  Reports yesterday morning had emesis, gastric content, nonbloody.  Reports diarrhea, 2-3 episodes, soft stools, denies blood in stools.  Denies chest pain, shortness of breath, palpitations. Denies dysuria, hematuria, change in urinary habits including frequency, urgency. Denies testicular pain.  Reports urine appears "dark yellow from dehydration".  FHX sister MI at age 42, mother s/p bypass, father s/p stents, denies history of cancer, grandmother ovarian cancer.  Reports +tobacco, denies drugs, reports EtOH 2-3 times a week, 3-4 cocktails at each event.  On exam, NAD, head NCAT, PERRL, FROM at neck, no tenderness to midline palpation, no stepoffs along length of spine, lungs CTAB with good inspiratory effort, +S1S2, no m/r/g, abdomen soft with +BS, +tenderness to RLQ extending laterally, no rebound, no guarding, neg Rovsings, ND, no CVAT, moving all extremities with 5/5 strength bilateral upper and lower extremities, good and equal  strength bilaterally; A/P: 48M with RLQ pain, in setting of perf'ed appy in March, will obtain labs, keep npo - extensive discussion with patient as visitors are all eating pizza, obtain CT A/P with po and IV, pain control, reassess

## 2019-07-02 NOTE — ED PROVIDER NOTE - CLINICAL SUMMARY MEDICAL DECISION MAKING FREE TEXT BOX
Smith Hogan MD: 49 yo M PM perforated appendicitis 03/2019 p/w sudden onset of right flank and RLQ pain with n/v/d started yesterday. Perforated appendix again vs intraabdominal abscess. cbc cmp, ptt, vbg, lipase, ct abd pelvis iv and po cont

## 2019-07-02 NOTE — ED ADULT NURSE NOTE - CHIEF COMPLAINT QUOTE
pt c/o R flank pain that travels to RUQ associated with n/v/d x yesterday.  per pt, was dx with perforated appendicitis but no surgery at this time.

## 2019-07-02 NOTE — H&P ADULT - NSHPLABSRESULTS_GEN_ALL_CORE
Labs:                       14.7   15.8  )-----------( 332      ( 2019 16:30 )             43.1   07-02    134<L>  |  97  |  12  ----------------------------<  107<H>  4.1   |  24  |  0.99    Ca    9.7      2019 16:30    TPro  7.3  /  Alb  4.0  /  TBili  0.8  /  DBili  x   /  AST  9<L>  /  ALT  14  /  AlkPhos  67  07-  Urinalysis Basic - ( 2019 17:51 )    Color: Yellow / Appearance: Clear / S.014 / pH: x  Gluc: x / Ketone: Negative  / Bili: Negative / Urobili: Negative   Blood: x / Protein: Negative / Nitrite: Negative   Leuk Esterase: Negative / RBC: 9 /hpf / WBC 1 /HPF   Sq Epi: x / Non Sq Epi: 0 /hpf / Bacteria: Negative    PT/INR - ( 2019 16:30 )   PT: 12.8 sec;   INR: 1.11 ratio         PTT - ( 2019 16:30 )  PTT:29.8 sec    < from: CT Abdomen and Pelvis w/ Oral Cont and w/ IV Cont (19 @ 19:13) >    IMPRESSION:     Redemonstrated perforated appendicitis with a 1.6 cm fluid collection   adjacent to the appendiceal tip, which is not significantly changed since   3/5/2019. There is interval increase in regional inflammatory changes   tracking superiorly from the appendiceal tip with associatedtrace right   upper quadrant free fluid.     There is mild wall thickening along the lateral aspect of the cecum,   likely reactive in nature.

## 2019-07-02 NOTE — H&P ADULT - NSHPPHYSICALEXAM_GEN_ALL_CORE
General: NAD, resting comfortably.   Cardiopulm: Non-labored breathing.   Ab: Soft, tender in RLQ, no guarding, no rebound tenderness.   Ext: Moves all 4 spontaneously.

## 2019-07-02 NOTE — H&P ADULT - HISTORY OF PRESENT ILLNESS
48M PMH of prior perforated appendicitis 03/2019 with 5 day hospitalization, managed nonoperatively who presented with one day of worsening RLQ pain with associated N/V, diarrhea. Minimal relief from pain meds.     H 48M PMH of prior perforated appendicitis 03/2019 with 5 day hospitalization, managed nonoperatively who presented with one day of worsening RLQ pain with associated N/V, diarrhea. Minimal relief from pain meds. No fevers.     In ED, he was hemodynamically normal. Labs notable for WBC of 15.8.     Of note, he followed up with Dr. Moreno as outpatient following hospitalization, reported decision made as outpatient to hold off of surgery.

## 2019-07-02 NOTE — ED PROVIDER NOTE - NS ED ROS FT
GENERAL: No fever or chills, EYES: no change in vision, HEENT: no trouble speaking, CARDIAC: no chest pain, palpitation PULMONARY: no cough or SOB, GI: + abdominal pain, + nausea, + vomiting, + diarrhea or constipation, : No changes in urination, SKIN: no rashes, NEURO: no headache,  MSK: No muscle pain ~Smith Hogan MD (PGY 1)

## 2019-07-03 ENCOUNTER — TRANSCRIPTION ENCOUNTER (OUTPATIENT)
Age: 49
End: 2019-07-03

## 2019-07-03 DIAGNOSIS — K35.32 ACUTE APPENDICITIS WITH PERFORATION, LOCALIZED PERITONITIS, AND GANGRENE, WITHOUT ABSCESS: ICD-10-CM

## 2019-07-03 DIAGNOSIS — Z79.899 OTHER LONG TERM (CURRENT) DRUG THERAPY: ICD-10-CM

## 2019-07-03 DIAGNOSIS — A41.9 SEPSIS, UNSPECIFIED ORGANISM: ICD-10-CM

## 2019-07-03 LAB
ANION GAP SERPL CALC-SCNC: 14 MMOL/L — SIGNIFICANT CHANGE UP (ref 5–17)
BLD GP AB SCN SERPL QL: NEGATIVE — SIGNIFICANT CHANGE UP
BUN SERPL-MCNC: 6 MG/DL — LOW (ref 7–23)
CALCIUM SERPL-MCNC: 9.1 MG/DL — SIGNIFICANT CHANGE UP (ref 8.4–10.5)
CHLORIDE SERPL-SCNC: 99 MMOL/L — SIGNIFICANT CHANGE UP (ref 96–108)
CO2 SERPL-SCNC: 25 MMOL/L — SIGNIFICANT CHANGE UP (ref 22–31)
CREAT SERPL-MCNC: 0.98 MG/DL — SIGNIFICANT CHANGE UP (ref 0.5–1.3)
GLUCOSE SERPL-MCNC: 132 MG/DL — HIGH (ref 70–99)
HCT VFR BLD CALC: 39.1 % — SIGNIFICANT CHANGE UP (ref 39–50)
HGB BLD-MCNC: 12.4 G/DL — LOW (ref 13–17)
MAGNESIUM SERPL-MCNC: 1.8 MG/DL — SIGNIFICANT CHANGE UP (ref 1.6–2.6)
MCHC RBC-ENTMCNC: 28.7 PG — SIGNIFICANT CHANGE UP (ref 27–34)
MCHC RBC-ENTMCNC: 31.7 GM/DL — LOW (ref 32–36)
MCV RBC AUTO: 90.5 FL — SIGNIFICANT CHANGE UP (ref 80–100)
PHOSPHATE SERPL-MCNC: 2.7 MG/DL — SIGNIFICANT CHANGE UP (ref 2.5–4.5)
PLATELET # BLD AUTO: 301 K/UL — SIGNIFICANT CHANGE UP (ref 150–400)
POTASSIUM SERPL-MCNC: 3.8 MMOL/L — SIGNIFICANT CHANGE UP (ref 3.5–5.3)
POTASSIUM SERPL-SCNC: 3.8 MMOL/L — SIGNIFICANT CHANGE UP (ref 3.5–5.3)
RBC # BLD: 4.32 M/UL — SIGNIFICANT CHANGE UP (ref 4.2–5.8)
RBC # FLD: 13.2 % — SIGNIFICANT CHANGE UP (ref 10.3–14.5)
RH IG SCN BLD-IMP: POSITIVE — SIGNIFICANT CHANGE UP
SODIUM SERPL-SCNC: 138 MMOL/L — SIGNIFICANT CHANGE UP (ref 135–145)
WBC # BLD: 17.19 K/UL — HIGH (ref 3.8–10.5)
WBC # FLD AUTO: 17.19 K/UL — HIGH (ref 3.8–10.5)

## 2019-07-03 PROCEDURE — 99223 1ST HOSP IP/OBS HIGH 75: CPT

## 2019-07-03 PROCEDURE — 99232 SBSQ HOSP IP/OBS MODERATE 35: CPT

## 2019-07-03 RX ORDER — MAGNESIUM SULFATE 500 MG/ML
2 VIAL (ML) INJECTION ONCE
Refills: 0 | Status: COMPLETED | OUTPATIENT
Start: 2019-07-03 | End: 2019-07-03

## 2019-07-03 RX ORDER — POTASSIUM PHOSPHATE, MONOBASIC POTASSIUM PHOSPHATE, DIBASIC 236; 224 MG/ML; MG/ML
15 INJECTION, SOLUTION INTRAVENOUS ONCE
Refills: 0 | Status: COMPLETED | OUTPATIENT
Start: 2019-07-03 | End: 2019-07-03

## 2019-07-03 RX ORDER — DEXTROSE MONOHYDRATE, SODIUM CHLORIDE, AND POTASSIUM CHLORIDE 50; .745; 4.5 G/1000ML; G/1000ML; G/1000ML
1000 INJECTION, SOLUTION INTRAVENOUS
Refills: 0 | Status: DISCONTINUED | OUTPATIENT
Start: 2019-07-03 | End: 2019-07-03

## 2019-07-03 RX ORDER — HEPARIN SODIUM 5000 [USP'U]/ML
5000 INJECTION INTRAVENOUS; SUBCUTANEOUS EVERY 8 HOURS
Refills: 0 | Status: DISCONTINUED | OUTPATIENT
Start: 2019-07-03 | End: 2019-07-04

## 2019-07-03 RX ADMIN — DEXTROSE MONOHYDRATE, SODIUM CHLORIDE, AND POTASSIUM CHLORIDE 50 MILLILITER(S): 50; .745; 4.5 INJECTION, SOLUTION INTRAVENOUS at 12:07

## 2019-07-03 RX ADMIN — PIPERACILLIN AND TAZOBACTAM 25 GRAM(S): 4; .5 INJECTION, POWDER, LYOPHILIZED, FOR SOLUTION INTRAVENOUS at 05:33

## 2019-07-03 RX ADMIN — POTASSIUM PHOSPHATE, MONOBASIC POTASSIUM PHOSPHATE, DIBASIC 62.5 MILLIMOLE(S): 236; 224 INJECTION, SOLUTION INTRAVENOUS at 15:57

## 2019-07-03 RX ADMIN — HEPARIN SODIUM 5000 UNIT(S): 5000 INJECTION INTRAVENOUS; SUBCUTANEOUS at 15:58

## 2019-07-03 RX ADMIN — Medication 50 GRAM(S): at 12:08

## 2019-07-03 RX ADMIN — PIPERACILLIN AND TAZOBACTAM 25 GRAM(S): 4; .5 INJECTION, POWDER, LYOPHILIZED, FOR SOLUTION INTRAVENOUS at 21:49

## 2019-07-03 RX ADMIN — PIPERACILLIN AND TAZOBACTAM 25 GRAM(S): 4; .5 INJECTION, POWDER, LYOPHILIZED, FOR SOLUTION INTRAVENOUS at 15:55

## 2019-07-03 RX ADMIN — HEPARIN SODIUM 5000 UNIT(S): 5000 INJECTION INTRAVENOUS; SUBCUTANEOUS at 05:33

## 2019-07-03 RX ADMIN — SODIUM CHLORIDE 100 MILLILITER(S): 9 INJECTION, SOLUTION INTRAVENOUS at 01:21

## 2019-07-03 RX ADMIN — HEPARIN SODIUM 5000 UNIT(S): 5000 INJECTION INTRAVENOUS; SUBCUTANEOUS at 21:50

## 2019-07-03 NOTE — CONSULT NOTE ADULT - PROBLEM SELECTOR RECOMMENDATION 3
I called and left message regarding hospitalization with PCP Dr Man Dinero who is out of the office today. He was last seen in 2016.  medications reviewed with patient - he does not take home meds

## 2019-07-03 NOTE — DISCHARGE NOTE PROVIDER - PROVIDER TOKENS
PROVIDER:[TOKEN:[[id number]],FOLLOWUP:[1 week]] PROVIDER:[TOKEN:[[id number]],FOLLOWUP:[1 week]],PROVIDER:[TOKEN:[[id number]],FOLLOWUP:[1 week]]

## 2019-07-03 NOTE — CONSULT NOTE ADULT - ASSESSMENT
49 yo M no PMH, recently diagnosed perforated appendicitis 3/2019 managed conservatively p/w RLQ pain, N/V, diarrhea adm with sepsis due to persistent perforated appendicitis.

## 2019-07-03 NOTE — CONSULT NOTE ADULT - SUBJECTIVE AND OBJECTIVE BOX
TRAUMA COM[facility]GEMENT MEDICINE ATTENDING INITIAL CONSULT NOTE    HPI:  48M PMH of prior perforated appendicitis 2019 with 5 day hospitalization, managed nonoperatively who presented with one day of worsening RLQ pain with associated N/V, diarrhea. Minimal relief from pain meds. No fevers.     In ED, he was hemodynamically normal. Labs notable for WBC of 15.8.     Of note, he followed up with Dr. Moreno as outpatient following hospitalization, reported decision made as outpatient to hold off of surgery. (2019 21:18)    SUBJECTIVE:   overnight no acute events.  states he feels much improved now.  no more n/v/abd pain. no f/chills, cp, sob.  ambulating.  wants to go home soon bc it's his bday tomorrow.    Home Medications:  multivitamin:  (2019 21:55)  Probiotic Formula:  (2019 21:55)      PAST MEDICAL & SURGICAL HISTORY:  Perforated appendicitis  No pertinent past medical history  No significant past surgical history      Review of Systems:   CONSTITUTIONAL: No fever, weight loss, or fatigue  EYES: No eye pain, visual disturbances, or discharge  ENMT:  No difficulty hearing, tinnitus, vertigo; No sinus or throat pain  NECK: No pain or stiffness  RESPIRATORY: No cough, wheezing, chills or hemoptysis; No shortness of breath  CARDIOVASCULAR: No chest pain, palpitations, dizziness, or leg swelling  GASTROINTESTINAL: No abdominal or epigastric pain. No nausea, vomiting, or hematemesis; No diarrhea or constipation. No melena or hematochezia.  GENITOURINARY: No dysuria, frequency, hematuria, or incontinence  NEUROLOGICAL: No headaches, memory loss, loss of strength, numbness, or tremors  SKIN: No itching, burning, rashes, or lesions   ENDOCRINE: No heat or cold intolerance; No hair loss  MUSCULOSKELETAL: No joint pain or swelling; No muscle, back, or extremity pain  PSYCHIATRIC: No depression, anxiety, mood swings, or difficulty sleeping  HEME/LYMPH: No easy bruising, or bleeding gums  ALLERY AND IMMUNOLOGIC: No hives or eczema    Allergies    No Known Allergies    Intolerances    Social History:   denies smoking, etoh.    FAMILY HISTORY:  No pertinent family history in first degree relatives   Noncontributory. denies hx of appendicitis in the family    Vital Signs Last 24 Hrs  T(C): 36.7 (2019 08:44), Max: 37.8 (2019 00:51)  T(F): 98.1 (2019 08:44), Max: 100 (2019 00:51)  HR: 63 (2019 08:44) (63 - 94)  BP: 100/64 (2019 08:44) (100/64 - 119/77)  BP(mean): --  RR: 18 (2019 08:44) (18 - 20)  SpO2: 93% (2019 08:44) (93% - 99%)  CAPILLARY BLOOD GLUCOSE      PHYSICAL EXAM:  GENERAL: NAD, well-developed  HEAD:  NCAT  EYES: EOMI  NECK: Supple, No JVD  CHEST/LUNG: Clear to auscultation bilaterally; No wheeze  HEART: Reg rate. No M/R/G.  ABDOMEN: SNTND,   EXTREMITIES:  2+ Peripheral Pulses, No clubbing, cyanosis, or edema  PSYCH: AAOx3      LABS:                        12.4   17.19 )-----------( 301      ( 2019 11:15 )             39.1     07-03    138  |  99  |  6<L>  ----------------------------<  132<H>  3.8   |  25  |  0.98    Ca    9.1      2019 07:04  Phos  2.7     07-03  Mg     1.8     07-03    TPro  7.3  /  Alb  4.0  /  TBili  0.8  /  DBili  x   /  AST  9<L>  /  ALT  14  /  AlkPhos  67  07-02    PT/INR - ( 2019 16:30 )   PT: 12.8 sec;   INR: 1.11 ratio         PTT - ( 2019 16:30 )  PTT:29.8 sec      Urinalysis Basic - ( 2019 17:51 )    Color: Yellow / Appearance: Clear / S.014 / pH: x  Gluc: x / Ketone: Negative  / Bili: Negative / Urobili: Negative   Blood: x / Protein: Negative / Nitrite: Negative   Leuk Esterase: Negative / RBC: 9 /hpf / WBC 1 /HPF   Sq Epi: x / Non Sq Epi: 0 /hpf / Bacteria: Negative      MEDICATIONS  (STANDING):  dextrose 5% + sodium chloride 0.45% with potassium chloride 20 mEq/L 1000 milliLiter(s) (50 mL/Hr) IV Continuous <Continuous>  heparin  Injectable 5000 Unit(s) SubCutaneous every 8 hours  piperacillin/tazobactam IVPB.. 3.375 Gram(s) IV Intermittent every 8 hours  potassium phosphate IVPB 15 milliMole(s) IV Intermittent once    MEDICATIONS  (PRN):

## 2019-07-03 NOTE — CONSULT NOTE ADULT - PROBLEM SELECTOR RECOMMENDATION 9
per CTAP with persistent perforated appendicitis and 1.6 cm fluid collection w/ increased inflammation  - as per surgery - outpatient eventual appendectomy  - advancing diet today - clears for now. if tolerates, d/c IVF  - not in any pain - no pain meds ordered for now  - monitor for temp, leukocytosis, and serial abd exams  - c/w IV zosyn as per surgery  - I d/w pt importance of colon cancer screening with colonoscopy as guidelines have changed and recommended age by ACS for colon cancer screen is now 45 and he is due. He states he will make appt with GI to have a colonoscopy and d/w surgery regarding timing.

## 2019-07-03 NOTE — CONSULT NOTE ADULT - PROBLEM SELECTOR RECOMMENDATION 2
meets SIRS criteria with leukocytosis and borderline tachycardia  - abx as above  - monitor for temp, leukocytosis

## 2019-07-03 NOTE — DISCHARGE NOTE PROVIDER - NSDCCPCAREPLAN_GEN_ALL_CORE_FT
PRINCIPAL DISCHARGE DIAGNOSIS  Diagnosis: Perforated appendicitis  Assessment and Plan of Treatment: ANTIBIOTICS: Please take oral Augmentin twice daily until course completed.  ACTIVITY: No heavy lifting anything more than 10-15lbs or straining. Otherwise, you may return to your usual level of physical activity. If you are taking narcotic pain medication (such as Percocet), do NOT drive a car, operate machinery or make important decisions.  DIET: Resume regular diet  NOTIFY YOUR SURGEON IF: You have any bleeding that does not stop, any pus draining from your wound, any fever (over 100.4 F) or chills, persistent nausea/vomiting with inability to tolerate food or liquids, persistent diarrhea, or if your pain is not controlled on your discharge pain medications.  FOLLOW-UP:  1. Please call to make a follow-up appointment within one week of discharge.   2. Please follow up with your primary care physician in one week regarding your hospitalization. PRINCIPAL DISCHARGE DIAGNOSIS  Diagnosis: Perforated appendicitis  Assessment and Plan of Treatment: ANTIBIOTICS:   ACTIVITY: No heavy lifting anything more than 10-15lbs or straining. Otherwise, you may return to your usual level of physical activity. If you are taking narcotic pain medication (such as Percocet), do NOT drive a car, operate machinery or make important decisions.  DIET: Resume regular diet  NOTIFY YOUR SURGEON IF: You have any bleeding that does not stop, any pus draining from your wound, any fever (over 100.4 F) or chills, persistent nausea/vomiting with inability to tolerate food or liquids, persistent diarrhea, or if your pain is not controlled on your discharge pain medications.  FOLLOW-UP:  1. Please call to make a follow-up appointment within one week of discharge.   2. Please follow up with your primary care physician in one week regarding your hospitalization. PRINCIPAL DISCHARGE DIAGNOSIS  Diagnosis: Perforated appendicitis  Assessment and Plan of Treatment: ANTIBIOTICS: Please take oral Augmentin 2x daily until course completed.  ACTIVITY: No heavy lifting anything more than 10-15lbs or straining. Otherwise, you may return to your usual level of physical activity. If you are taking narcotic pain medication (such as Percocet), do NOT drive a car, operate machinery or make important decisions.  DIET: Resume regular diet  NOTIFY YOUR SURGEON IF: You have any bleeding that does not stop, any pus draining from your wound, any fever (over 100.4 F) or chills, persistent nausea/vomiting with inability to tolerate food or liquids, persistent diarrhea, or if your pain is not controlled on your discharge pain medications.  FOLLOW-UP:  1. Please call to make a follow-up appointment within one week of discharge.   2. Please follow up with your primary care physician in one week regarding your hospitalization.

## 2019-07-03 NOTE — DISCHARGE NOTE PROVIDER - CARE PROVIDERS DIRECT ADDRESSES
[email or web address]

## 2019-07-03 NOTE — DISCHARGE NOTE PROVIDER - CARE PROVIDER_API CALL
Catarino Dozier)  Surgery; Surgical Critical Care  [address] [place] [address]  Phone: [phone]  Fax: [phone]  Follow Up Time: 1 week Catarino Dozier)  Surgery; Surgical Critical Care  [address] [place] [address]  Phone: [phone]  Fax: [phone]  Follow Up Time: 1 week    Lalo Moreno)  Surgery; Surgical Critical Care  [address] [facility], [address] [address]  Phone: [phone]  Fax: [phone]  Follow Up Time: 1 week

## 2019-07-03 NOTE — PROGRESS NOTE ADULT - SUBJECTIVE AND OBJECTIVE BOX
STATUS POST:      POST OPERATIVE DAY #:     Vital Signs Last 24 Hrs  T(C): 36.7 (2019 08:44), Max: 37.8 (2019 00:51)  T(F): 98.1 (2019 08:44), Max: 100 (2019 00:51)  HR: 63 (2019 08:44) (63 - 94)  BP: 100/64 (2019 08:44) (100/64 - 119/77)  BP(mean): --  RR: 18 (2019 08:44) (18 - 20)  SpO2: 93% (2019 08:44) (93% - 99%)    I&O's Summary    2019 07:01  -  2019 07:00  --------------------------------------------------------  IN: 700 mL / OUT: 0 mL / NET: 700 mL        SUBJECTIVE: Pt seen    Pain: [ ] YES [ ] NO  Pain (0-10):              Pain Control Adequate: [ ] YES [ ] NO  SOB: [ ]YES [ ] NO  Chest Discomfort: [ ] YES [ ] NO    Nausea: [ ] YES [ ] NO           Vomiting: [ ] YES [ ] NO  Flatus: [ ] YES [ ] NO             Bowel Movement: [ ] YES [ ] NO     Void: [ ]YES [ ]No    Physical Exam:  General Appearance: Appears well, NAD  Neck: Supple  Chest: Equal expansion bilaterally, equal breath sounds  CV: Pulse regular presently  Abdomen: Soft, nontense, appropriate incisional tenderness, dressings clean and dry and intact  Extremities: Grossly symmetric, SCD's in place     LABS:                        14.7   15.8  )-----------( 332      ( 2019 16:30 )             43.1     07-03    138  |  99  |  6<L>  ----------------------------<  132<H>  3.8   |  25  |  0.98    Ca    9.1      2019 07:04  Phos  2.7     07-03  Mg     1.8     07-03    TPro  7.3  /  Alb  4.0  /  TBili  0.8  /  DBili  x   /  AST  9<L>  /  ALT  14  /  AlkPhos  67  07-    PT/INR - ( 2019 16:30 )   PT: 12.8 sec;   INR: 1.11 ratio         PTT - ( 2019 16:30 )  PTT:29.8 sec  Urinalysis Basic - ( 2019 17:51 )    Color: Yellow / Appearance: Clear / S.014 / pH: x  Gluc: x / Ketone: Negative  / Bili: Negative / Urobili: Negative   Blood: x / Protein: Negative / Nitrite: Negative   Leuk Esterase: Negative / RBC: 9 /hpf / WBC 1 /HPF   Sq Epi: x / Non Sq Epi: 0 /hpf / Bacteria: Negative  .       RADIOLOGY & ADDITIONAL STUDIES:    < from: CT Abdomen and Pelvis w/ Oral Cont and w/ IV Cont (19 @ 19:13) >  IMPRESSION:     Redemonstrated perforated appendicitis with a 1.6 cm fluid collection   adjacent to the appendiceal tip, which is not significantly changed since   3/5/2019. There is interval increase in regional inflammatory changes   tracking superiorly from the appendiceal tip with associatedtrace right   upper quadrant free fluid.     There is mild wall thickening along the lateral aspect of the cecum,   likely reactive in nature.     < end of copied text >      Ender Kay, PGY-1  Surgery 48M PMH of prior perforated appendicitis 2019 with 5 day hospitalization, managed nonoperatively who presented with one day of worsening RLQ pain with associated N/V, diarrhea. Minimal relief from pain meds. No fevers. In ED on , he was hemodynamically normal. Labs notable for WBC of 15.8.     Of note, he followed up with Dr. Moreno as outpatient following hospitalization, reported decision made as outpatient to hold off of surgery.       Vital Signs Last 24 Hrs  T(C): 36.7 (2019 08:44), Max: 37.8 (2019 00:51)  T(F): 98.1 (2019 08:44), Max: 100 (2019 00:51)  HR: 63 (2019 08:44) (63 - 94)  BP: 100/64 (2019 08:44) (100/64 - 119/77)  BP(mean): --  RR: 18 (2019 08:44) (18 - 20)  SpO2: 93% (2019 08:44) (93% - 99%)    I&O's Summary    2019 07:01  -  2019 07:00  --------------------------------------------------------  IN: 700 mL / OUT: 0 mL / NET: 700 mL        SUBJECTIVE: Pt seen by physician on 7/3. There is no acute overnight events. He reports moderate RLQ pain. He has never had a colonoscopy, which may be necessary prior to interval appy.    Pain: [ ] YES [ ] NO  Pain (0-10):              Pain Control Adequate: [ ] YES [ ] NO  SOB: [ ]YES [ ] NO  Chest Discomfort: [ ] YES [ ] NO    Nausea: [ ] YES [ ] NO           Vomiting: [ ] YES [ ] NO  Flatus: [ ] YES [ ] NO             Bowel Movement: [ ] YES [ ] NO     Void: [ ]YES [ ]No    Physical Exam:  General Appearance: Appears well, NAD, AOx3  Neck: Supple  Chest: Equal expansion bilaterally, equal breath sounds  CV: Pulse regular presently  Abdomen: soft,, moderate RLQ tenderness  Extremities: Grossly symmetric    LABS:                        14.7   15.8  )-----------( 332      ( 2019 16:30 )             43.1     07-03    138  |  99  |  6<L>  ----------------------------<  132<H>  3.8   |  25  |  0.98    Ca    9.1      2019 07:04  Phos  2.7     07-  Mg     1.8     07-03    TPro  7.3  /  Alb  4.0  /  TBili  0.8  /  DBili  x   /  AST  9<L>  /  ALT  14  /  AlkPhos  67  07-02    PT/INR - ( 2019 16:30 )   PT: 12.8 sec;   INR: 1.11 ratio         PTT - ( 2019 16:30 )  PTT:29.8 sec  Urinalysis Basic - ( 2019 17:51 )    Color: Yellow / Appearance: Clear / S.014 / pH: x  Gluc: x / Ketone: Negative  / Bili: Negative / Urobili: Negative   Blood: x / Protein: Negative / Nitrite: Negative   Leuk Esterase: Negative / RBC: 9 /hpf / WBC 1 /HPF   Sq Epi: x / Non Sq Epi: 0 /hpf / Bacteria: Negative  .       RADIOLOGY & ADDITIONAL STUDIES:    < from: CT Abdomen and Pelvis w/ Oral Cont and w/ IV Cont (19 @ 19:13) >  IMPRESSION:     Redemonstrated perforated appendicitis with a 1.6 cm fluid collection   adjacent to the appendiceal tip, which is not significantly changed since   3/5/2019. There is interval increase in regional inflammatory changes   tracking superiorly from the appendiceal tip with associatedtrace right   upper quadrant free fluid.     There is mild wall thickening along the lateral aspect of the cecum,   likely reactive in nature.     < end of copied text >      Ender Kay, PGY-1  Surgery

## 2019-07-03 NOTE — DISCHARGE NOTE PROVIDER - HOSPITAL COURSE
48M PMH of prior perforated appendicitis 03/2019 with 5 day hospitalization, managed nonoperatively who presented with one day of worsening RLQ pain with associated N/V, diarrhea. Minimal relief from pain meds. No fevers.         In ED, he was hemodynamically normal. Labs notable for WBC of 15.8.         Of note, he followed up with Dr. Moreno as outpatient following hospitalization, reported decision made as outpatient to hold off of surgery.     - Admit to Surgery, Dr. Abdalla.     - Nil Per Os, continue IV Fluids.     - Continue IV abx.     - Serial abdominal exams to evaluate for worsening peritonitis.         Diet was adv to clears then reg which he tolerated.  At the time of discharge, the patient was hemodynamically stable, was tolerating PO diet, was voiding urine and passing stool, was ambulating, and was comfortable with adequate pain control. The patient was instructed to follow up with Dr. Dozier within 1-2 weeks after discharge from the hospital. The patient felt comfortable with discharge. The patient was discharged to home. The patient had no other issues.

## 2019-07-03 NOTE — DISCHARGE NOTE PROVIDER - NSDCFUADDINST_GEN_ALL_CORE_FT
Colon cancer screening with colonoscopy as guidelines have changed and recommended age by ACS for colon cancer screen is now 45 and you are due. Patient verbal agreement that he will make appt with GI to have a colonoscopy.

## 2019-07-03 NOTE — DISCHARGE NOTE PROVIDER - NSDCACTIVITY_GEN_ALL_CORE
No heavy lifting/straining/Showering allowed/Do not drive or operate machinery/Do not make important decisions

## 2019-07-04 ENCOUNTER — TRANSCRIPTION ENCOUNTER (OUTPATIENT)
Age: 49
End: 2019-07-04

## 2019-07-04 VITALS
OXYGEN SATURATION: 96 % | RESPIRATION RATE: 18 BRPM | TEMPERATURE: 97 F | SYSTOLIC BLOOD PRESSURE: 103 MMHG | DIASTOLIC BLOOD PRESSURE: 70 MMHG | HEART RATE: 65 BPM

## 2019-07-04 LAB
HCT VFR BLD CALC: 37.5 % — LOW (ref 39–50)
HGB BLD-MCNC: 12.3 G/DL — LOW (ref 13–17)
MCHC RBC-ENTMCNC: 29.6 PG — SIGNIFICANT CHANGE UP (ref 27–34)
MCHC RBC-ENTMCNC: 32.8 GM/DL — SIGNIFICANT CHANGE UP (ref 32–36)
MCV RBC AUTO: 90.1 FL — SIGNIFICANT CHANGE UP (ref 80–100)
PLATELET # BLD AUTO: 321 K/UL — SIGNIFICANT CHANGE UP (ref 150–400)
RBC # BLD: 4.16 M/UL — LOW (ref 4.2–5.8)
RBC # FLD: 13.3 % — SIGNIFICANT CHANGE UP (ref 10.3–14.5)
WBC # BLD: 12.68 K/UL — HIGH (ref 3.8–10.5)
WBC # FLD AUTO: 12.68 K/UL — HIGH (ref 3.8–10.5)

## 2019-07-04 PROCEDURE — 99231 SBSQ HOSP IP/OBS SF/LOW 25: CPT

## 2019-07-04 RX ORDER — ACETAMINOPHEN 500 MG
2 TABLET ORAL
Qty: 0 | Refills: 0 | DISCHARGE
Start: 2019-07-04

## 2019-07-04 RX ORDER — ACETAMINOPHEN 500 MG
650 TABLET ORAL EVERY 6 HOURS
Refills: 0 | Status: DISCONTINUED | OUTPATIENT
Start: 2019-07-04 | End: 2019-07-04

## 2019-07-04 RX ADMIN — PIPERACILLIN AND TAZOBACTAM 25 GRAM(S): 4; .5 INJECTION, POWDER, LYOPHILIZED, FOR SOLUTION INTRAVENOUS at 05:50

## 2019-07-04 RX ADMIN — HEPARIN SODIUM 5000 UNIT(S): 5000 INJECTION INTRAVENOUS; SUBCUTANEOUS at 05:50

## 2019-07-04 RX ADMIN — Medication 650 MILLIGRAM(S): at 04:44

## 2019-07-04 RX ADMIN — Medication 650 MILLIGRAM(S): at 04:14

## 2019-07-04 NOTE — PROGRESS NOTE ADULT - SUBJECTIVE AND OBJECTIVE BOX
Trauma Surgery Progress Note    Subjective:   Pt seen & examined at bedside. Overnight patient expressed abdominal pain, which was relieve by PO tylenol. Pain is now well controlled.  No Fever, chills, nausea, or vomiting,  Passing flatus and stool.  Patient on regular diet.       Objective:  Vital Signs Last 24 Hrs  T(C): 36.3 (04 Jul 2019 09:47), Max: 37.1 (03 Jul 2019 17:59)  T(F): 97.3 (04 Jul 2019 09:47), Max: 98.8 (03 Jul 2019 17:59)  HR: 65 (04 Jul 2019 09:47) (62 - 82)  BP: 103/- (04 Jul 2019 09:47) (103/- - 110/73)  BP(mean): --  RR: 18 (04 Jul 2019 09:47) (18 - 18)  SpO2: 96% (04 Jul 2019 09:47) (95% - 96%)    I&O's Detail    03 Jul 2019 07:01  -  04 Jul 2019 07:00  --------------------------------------------------------  IN:    Oral Fluid: 480 mL    Solution: 300 mL    Solution: 300 mL  Total IN: 1080 mL    OUT:  Total OUT: 0 mL    Total NET: 1080 mL      04 Jul 2019 07:01  -  04 Jul 2019 10:27  --------------------------------------------------------  IN:    Oral Fluid: 240 mL  Total IN: 240 mL    OUT:  Total OUT: 0 mL    Total NET: 240 mL        MEDICATIONS  (STANDING):  heparin  Injectable 5000 Unit(s) SubCutaneous every 8 hours  piperacillin/tazobactam IVPB.. 3.375 Gram(s) IV Intermittent every 8 hours    MEDICATIONS  (PRN):  acetaminophen   Tablet .. 650 milliGRAM(s) Oral every 6 hours PRN Moderate Pain (4 - 6)                            12.4   17.19 )-----------( 301      ( 03 Jul 2019 11:15 )             39.1       07-03    138  |  99  |  6<L>  ----------------------------<  132<H>  3.8   |  25  |  0.98    Ca    9.1      03 Jul 2019 07:04  Phos  2.7     07-03  Mg     1.8     07-03    TPro  7.3  /  Alb  4.0  /  TBili  0.8  /  DBili  x   /  AST  9<L>  /  ALT  14  /  AlkPhos  67  07-02          Physical Exam:  Gen: NAD, resting comfortably in bed  Resp: No increased WOB   Abd: mild RLQ tenderness, nondistended, soft, compressible

## 2019-07-04 NOTE — PROGRESS NOTE ADULT - ASSESSMENT
47 yo M w/ PMHx of perforated appendicitis in 03/2019, who was tx w/ medical management at that time, who presented on 7/2 w/ recurrent appendicitis. CT Chest Abd/Pelv demonstrates perf. appendicitis.    Plan:  -IV Abx  -Supportive care  -Clear liquids 7/3 lunch, regular diet as tolerated thereafter  -Interval Appendectomy (elective in ~ 6 wks due to present inflammation)  -GI consult  -pre-op colonoscopy, per GI
49 yo M w/ PMHx of perforated appendicitis in 03/2019, who was tx w/ medical management at that time, who presented on 7/2 w/ recurrent appendicitis. CT Chest Abd/Pelv demonstrates perf. appendicitis.    Plan:  -IV Abx  -Supportive care  -Clear liquids 7/3 lunch, regular diet as tolerated thereafter  -Interval Appendectomy (elective in ~ 6 wks due to present inflammation)  -GI consult  -pre-op colonoscopy, per GI

## 2019-07-04 NOTE — DISCHARGE NOTE NURSING/CASE MANAGEMENT/SOCIAL WORK - NSDCDPATPORTLINK_GEN_ALL_CORE
You can access the [facility] Patient Portal, offered by [facility], by registering with the following website: [email or web address]

## 2019-07-04 NOTE — PROGRESS NOTE ADULT - ATTENDING COMMENTS
Patient seen and examined   Doing well  Pain controlled  No signs of ongoing sepsis    - ok to d/c home  - follow up instructions discussed with patient
Patient seen and examined  This is now the second hospitalization for perforated appendicitis (hospitalized 3/5/19 thru 3/9/19).  The patient states that he is feeling much better this morning, and pain has resolved.   I have discussed the possible need for semi-urgent surgery if the patient continues to recurrent symptoms.  However, given that the patient rapidly improving, I agree with attempt at non-operative management with interval appendectomy in 3-6 months

## 2019-07-10 PROCEDURE — 83735 ASSAY OF MAGNESIUM: CPT

## 2019-07-10 PROCEDURE — 82803 BLOOD GASES ANY COMBINATION: CPT

## 2019-07-10 PROCEDURE — 99285 EMERGENCY DEPT VISIT HI MDM: CPT | Mod: 25

## 2019-07-10 PROCEDURE — 85027 COMPLETE CBC AUTOMATED: CPT

## 2019-07-10 PROCEDURE — 86901 BLOOD TYPING SEROLOGIC RH(D): CPT

## 2019-07-10 PROCEDURE — 83690 ASSAY OF LIPASE: CPT

## 2019-07-10 PROCEDURE — 85014 HEMATOCRIT: CPT

## 2019-07-10 PROCEDURE — 84295 ASSAY OF SERUM SODIUM: CPT

## 2019-07-10 PROCEDURE — 82435 ASSAY OF BLOOD CHLORIDE: CPT

## 2019-07-10 PROCEDURE — 74177 CT ABD & PELVIS W/CONTRAST: CPT

## 2019-07-10 PROCEDURE — 84100 ASSAY OF PHOSPHORUS: CPT

## 2019-07-10 PROCEDURE — 80053 COMPREHEN METABOLIC PANEL: CPT

## 2019-07-10 PROCEDURE — 86900 BLOOD TYPING SEROLOGIC ABO: CPT

## 2019-07-10 PROCEDURE — 82330 ASSAY OF CALCIUM: CPT

## 2019-07-10 PROCEDURE — 85730 THROMBOPLASTIN TIME PARTIAL: CPT

## 2019-07-10 PROCEDURE — 83605 ASSAY OF LACTIC ACID: CPT

## 2019-07-10 PROCEDURE — 84132 ASSAY OF SERUM POTASSIUM: CPT

## 2019-07-10 PROCEDURE — 96374 THER/PROPH/DIAG INJ IV PUSH: CPT | Mod: XU

## 2019-07-10 PROCEDURE — 80048 BASIC METABOLIC PNL TOTAL CA: CPT

## 2019-07-10 PROCEDURE — 82947 ASSAY GLUCOSE BLOOD QUANT: CPT

## 2019-07-10 PROCEDURE — 86850 RBC ANTIBODY SCREEN: CPT

## 2019-07-10 PROCEDURE — 81001 URINALYSIS AUTO W/SCOPE: CPT

## 2019-07-10 PROCEDURE — 85610 PROTHROMBIN TIME: CPT

## 2019-08-01 PROBLEM — K35.32 ACUTE APPENDICITIS WITH PERFORATION, LOCALIZED PERITONITIS, AND GANGRENE, WITHOUT ABSCESS: Chronic | Status: ACTIVE | Noted: 2019-07-02

## 2019-08-20 ENCOUNTER — APPOINTMENT (OUTPATIENT)
Dept: GASTROENTEROLOGY | Facility: CLINIC | Age: 49
End: 2019-08-20
Payer: MEDICAID

## 2019-08-20 ENCOUNTER — APPOINTMENT (OUTPATIENT)
Dept: HUMAN REPRODUCTION | Facility: CLINIC | Age: 49
End: 2019-08-20

## 2019-08-20 VITALS
HEIGHT: 72 IN | WEIGHT: 186 LBS | DIASTOLIC BLOOD PRESSURE: 80 MMHG | SYSTOLIC BLOOD PRESSURE: 120 MMHG | TEMPERATURE: 98.4 F | BODY MASS INDEX: 25.19 KG/M2 | HEART RATE: 68 BPM | OXYGEN SATURATION: 98 %

## 2019-08-20 DIAGNOSIS — Z12.11 ENCOUNTER FOR SCREENING FOR MALIGNANT NEOPLASM OF COLON: ICD-10-CM

## 2019-08-20 DIAGNOSIS — K35.32 ACUTE APPENDICITIS W/ PERFORATION AND LOCALIZED PERITONITIS, W/O ABSCESS: ICD-10-CM

## 2019-08-20 DIAGNOSIS — Z82.49 FAMILY HISTORY OF ISCHEMIC HEART DISEASE AND OTHER DISEASES OF THE CIRCULATORY SYSTEM: ICD-10-CM

## 2019-08-20 DIAGNOSIS — R10.31 RIGHT LOWER QUADRANT PAIN: ICD-10-CM

## 2019-08-20 PROCEDURE — 99205 OFFICE O/P NEW HI 60 MIN: CPT

## 2019-08-20 RX ORDER — AMOXICILLIN AND CLAVULANATE POTASSIUM 875; 125 MG/1; MG/1
875-125 TABLET, COATED ORAL
Qty: 14 | Refills: 0 | Status: COMPLETED | COMMUNITY
Start: 2019-07-03 | End: 2019-08-20

## 2019-08-20 RX ORDER — OXYCODONE 5 MG/1
5 TABLET ORAL
Qty: 12 | Refills: 0 | Status: COMPLETED | COMMUNITY
Start: 2019-03-09 | End: 2019-08-20

## 2019-08-20 NOTE — REVIEW OF SYSTEMS
[As Noted in HPI] : as noted in HPI [Abdominal Pain] : abdominal pain [Negative] : Heme/Lymph [Constipation] : no constipation [Vomiting] : no vomiting [Diarrhea] : no diarrhea [Heartburn] : no heartburn [Melena] : no melena

## 2019-08-20 NOTE — HISTORY OF PRESENT ILLNESS
[Heartburn] : denies heartburn [Vomiting] : denies vomiting [Nausea] : denies nausea [Constipation] : denies constipation [Abdominal Swelling] : denies abdominal swelling [Yellow Skin Or Eyes (Jaundice)] : denies jaundice [Rectal Pain] : denies rectal pain [Diarrhea] : diarrhea [Abdominal Pain] : abdominal pain [Wt Gain ___ Lbs] : no recent weight gain [Wt Loss ___ Lbs] : no recent weight loss [Peptic Ulcer Disease] : no peptic ulcer disease [GERD] : no gastroesophageal reflux disease [Hiatus Hernia] : no hiatus hernia [Cholelithiasis] : no cholelithiasis [Pancreatitis] : no pancreatitis [Inflammatory Bowel Disease] : no inflammatory bowel disease [Kidney Stone] : no kidney stone [Irritable Bowel Syndrome] : no irritable bowel syndrome [Diverticulitis] : no diverticulitis [Abdominal Surgery] : no abdominal surgery [Malignancy] : no malignancy [Alcohol Abuse] : no alcohol abuse [Appendectomy] : no appendectomy [Cholecystectomy] : no cholecystectomy [de-identified] : 49 year old man presents for a screening colonoscopy. This will be his first colonoscopy. He denies rectal bleeding, melena or hematemesis. He was recently treated with IV and oral antibiotics for acute appendicitis confirmed on CT scan 7/2019.. He is doing well with no further abdominal pain.

## 2019-08-20 NOTE — PHYSICAL EXAM
[General Appearance - Alert] : alert [General Appearance - In No Acute Distress] : in no acute distress [PERRL With Normal Accommodation] : pupils were equal in size, round, and reactive to light [Sclera] : the sclera and conjunctiva were normal [Oropharynx] : the oropharynx was normal [Extraocular Movements] : extraocular movements were intact [Outer Ear] : the ears and nose were normal in appearance [Neck Appearance] : the appearance of the neck was normal [Neck Cervical Mass (___cm)] : no neck mass was observed [Thyroid Diffuse Enlargement] : the thyroid was not enlarged [Jugular Venous Distention Increased] : there was no jugular-venous distention [Thyroid Nodule] : there were no palpable thyroid nodules [Heart Rate And Rhythm] : heart rate was normal and rhythm regular [Auscultation Breath Sounds / Voice Sounds] : lungs were clear to auscultation bilaterally [Heart Sounds] : normal S1 and S2 [Heart Sounds Gallop] : no gallops [Murmurs] : no murmurs [Bowel Sounds] : normal bowel sounds [Heart Sounds Pericardial Friction Rub] : no pericardial rub [Abdomen Soft] : soft [Abdomen Tenderness] : non-tender [Abdomen Mass (___ Cm)] : no abdominal mass palpated [] : no hepato-splenomegaly [Cervical Lymph Nodes Enlarged Anterior Bilaterally] : anterior cervical [Cervical Lymph Nodes Enlarged Posterior Bilaterally] : posterior cervical [Supraclavicular Lymph Nodes Enlarged Bilaterally] : supraclavicular [No Spinal Tenderness] : no spinal tenderness [No CVA Tenderness] : no ~M costovertebral angle tenderness [Deep Tendon Reflexes (DTR)] : deep tendon reflexes were 2+ and symmetric [Sensation] : the sensory exam was normal to light touch and pinprick [Oriented To Time, Place, And Person] : oriented to person, place, and time [No Focal Deficits] : no focal deficits [Impaired Insight] : insight and judgment were intact [Affect] : the affect was normal

## 2019-10-04 ENCOUNTER — OUTPATIENT (OUTPATIENT)
Dept: OUTPATIENT SERVICES | Facility: HOSPITAL | Age: 49
LOS: 1 days | Discharge: HOME | End: 2019-10-04

## 2019-10-04 ENCOUNTER — APPOINTMENT (OUTPATIENT)
Dept: GASTROENTEROLOGY | Facility: HOSPITAL | Age: 49
End: 2019-10-04
Payer: MEDICAID

## 2019-10-04 DIAGNOSIS — R19.7 DIARRHEA, UNSPECIFIED: ICD-10-CM

## 2019-10-04 PROCEDURE — 45378 DIAGNOSTIC COLONOSCOPY: CPT

## 2019-10-04 RX ORDER — SODIUM CHLORIDE 9 MG/ML
1000 INJECTION, SOLUTION INTRAVENOUS
Refills: 0 | Status: DISCONTINUED | OUTPATIENT
Start: 2019-10-04 | End: 2019-10-20

## 2019-10-04 RX ADMIN — SODIUM CHLORIDE 30 MILLILITER(S): 9 INJECTION, SOLUTION INTRAVENOUS at 11:00

## 2019-10-30 ENCOUNTER — APPOINTMENT (OUTPATIENT)
Dept: TRAUMA SURGERY | Facility: CLINIC | Age: 49
End: 2019-10-30

## 2019-11-06 ENCOUNTER — APPOINTMENT (OUTPATIENT)
Dept: TRAUMA SURGERY | Facility: CLINIC | Age: 49
End: 2019-11-06
Payer: MEDICAID

## 2019-11-06 VITALS
BODY MASS INDEX: 24.52 KG/M2 | SYSTOLIC BLOOD PRESSURE: 138 MMHG | HEIGHT: 72 IN | DIASTOLIC BLOOD PRESSURE: 84 MMHG | HEART RATE: 73 BPM | WEIGHT: 181 LBS | TEMPERATURE: 98.3 F

## 2019-11-06 PROCEDURE — 99211 OFF/OP EST MAY X REQ PHY/QHP: CPT

## 2019-11-06 NOTE — HISTORY OF PRESENT ILLNESS
[de-identified] : Mr. Barnes returns for follow up. He had an episode of perforated appendicitis which was treated nonoperatively early this year, and then had another episode of nonperforated appendicitis in July, which was also treated nonoperatively due to cecal inflammation. Currently, he feels well. He had a colonoscopy which was normal. He would like to proceed with appendectomy given his multiple episodes of appendicitis. He is in good health, no complaints.\par \par I discussed with  that I agree and recommend laparoscopic possible open appendectomy, and that the risk of having to open is relatively high given his multiple episides. I explained that if we are able to do the surgery laparoscopically, he can likely have the surgery and go home the same day. We discussed recovery, pain management, and no lifting to decrease the risk of hernia. I also explained that if we have to open, that would entail a large incision. He was very hesitant about this and asked if I could stop if I was unable to do the surgery laparoscopically. I explained that if we have to open for bleeding or bowel injury, I would not be able to abort the procedure, but if there was too much scar tissue, aborting the procedure would be possible. I asked him to consider it and we will discuss again the morning of surgery (I asked him to remind me and we will discuss this).\par \par Will book for laparoscopic appendectomy, possible open.

## 2019-11-13 ENCOUNTER — OUTPATIENT (OUTPATIENT)
Dept: OUTPATIENT SERVICES | Facility: HOSPITAL | Age: 49
LOS: 1 days | End: 2019-11-13
Payer: MEDICAID

## 2019-11-13 VITALS
TEMPERATURE: 97 F | WEIGHT: 184.97 LBS | OXYGEN SATURATION: 99 % | HEART RATE: 79 BPM | HEIGHT: 72 IN | RESPIRATION RATE: 18 BRPM | SYSTOLIC BLOOD PRESSURE: 123 MMHG | DIASTOLIC BLOOD PRESSURE: 79 MMHG

## 2019-11-13 DIAGNOSIS — Z01.818 ENCOUNTER FOR OTHER PREPROCEDURAL EXAMINATION: ICD-10-CM

## 2019-11-13 DIAGNOSIS — K35.30 ACUTE APPENDICITIS WITH LOCALIZED PERITONITIS, WITHOUT PERFORATION OR GANGRENE: ICD-10-CM

## 2019-11-13 DIAGNOSIS — K35.32 ACUTE APPENDICITIS WITH PERFORATION, LOCALIZED PERITONITIS, AND GANGRENE, WITHOUT ABSCESS: ICD-10-CM

## 2019-11-13 LAB
ALBUMIN SERPL ELPH-MCNC: 4.3 G/DL — SIGNIFICANT CHANGE UP (ref 3.3–5)
ALP SERPL-CCNC: 67 U/L — SIGNIFICANT CHANGE UP (ref 40–120)
ALT FLD-CCNC: 17 U/L — SIGNIFICANT CHANGE UP (ref 10–45)
ANION GAP SERPL CALC-SCNC: 14 MMOL/L — SIGNIFICANT CHANGE UP (ref 5–17)
AST SERPL-CCNC: 15 U/L — SIGNIFICANT CHANGE UP (ref 10–40)
BILIRUB SERPL-MCNC: 0.2 MG/DL — SIGNIFICANT CHANGE UP (ref 0.2–1.2)
BUN SERPL-MCNC: 8 MG/DL — SIGNIFICANT CHANGE UP (ref 7–23)
CALCIUM SERPL-MCNC: 9.5 MG/DL — SIGNIFICANT CHANGE UP (ref 8.4–10.5)
CHLORIDE SERPL-SCNC: 101 MMOL/L — SIGNIFICANT CHANGE UP (ref 96–108)
CO2 SERPL-SCNC: 25 MMOL/L — SIGNIFICANT CHANGE UP (ref 22–31)
CREAT SERPL-MCNC: 0.91 MG/DL — SIGNIFICANT CHANGE UP (ref 0.5–1.3)
GLUCOSE SERPL-MCNC: 112 MG/DL — HIGH (ref 70–99)
HCT VFR BLD CALC: 41.9 % — SIGNIFICANT CHANGE UP (ref 39–50)
HGB BLD-MCNC: 13.6 G/DL — SIGNIFICANT CHANGE UP (ref 13–17)
MCHC RBC-ENTMCNC: 29.2 PG — SIGNIFICANT CHANGE UP (ref 27–34)
MCHC RBC-ENTMCNC: 32.5 GM/DL — SIGNIFICANT CHANGE UP (ref 32–36)
MCV RBC AUTO: 90.1 FL — SIGNIFICANT CHANGE UP (ref 80–100)
PLATELET # BLD AUTO: 393 K/UL — SIGNIFICANT CHANGE UP (ref 150–400)
POTASSIUM SERPL-MCNC: 4 MMOL/L — SIGNIFICANT CHANGE UP (ref 3.5–5.3)
POTASSIUM SERPL-SCNC: 4 MMOL/L — SIGNIFICANT CHANGE UP (ref 3.5–5.3)
PROT SERPL-MCNC: 7.1 G/DL — SIGNIFICANT CHANGE UP (ref 6–8.3)
RBC # BLD: 4.65 M/UL — SIGNIFICANT CHANGE UP (ref 4.2–5.8)
RBC # FLD: 13.2 % — SIGNIFICANT CHANGE UP (ref 10.3–14.5)
SODIUM SERPL-SCNC: 140 MMOL/L — SIGNIFICANT CHANGE UP (ref 135–145)
WBC # BLD: 5.76 K/UL — SIGNIFICANT CHANGE UP (ref 3.8–10.5)
WBC # FLD AUTO: 5.76 K/UL — SIGNIFICANT CHANGE UP (ref 3.8–10.5)

## 2019-11-13 PROCEDURE — 80053 COMPREHEN METABOLIC PANEL: CPT

## 2019-11-13 PROCEDURE — G0463: CPT

## 2019-11-13 PROCEDURE — 85027 COMPLETE CBC AUTOMATED: CPT

## 2019-11-13 RX ORDER — LIDOCAINE HCL 20 MG/ML
0.2 VIAL (ML) INJECTION ONCE
Refills: 0 | Status: DISCONTINUED | OUTPATIENT
Start: 2019-11-20 | End: 2019-11-20

## 2019-11-13 RX ORDER — SODIUM CHLORIDE 9 MG/ML
3 INJECTION INTRAMUSCULAR; INTRAVENOUS; SUBCUTANEOUS EVERY 8 HOURS
Refills: 0 | Status: DISCONTINUED | OUTPATIENT
Start: 2019-11-20 | End: 2019-11-20

## 2019-11-13 RX ORDER — CHLORHEXIDINE GLUCONATE 213 G/1000ML
1 SOLUTION TOPICAL ONCE
Refills: 0 | Status: DISCONTINUED | OUTPATIENT
Start: 2019-11-20 | End: 2019-11-20

## 2019-11-13 RX ORDER — CEFOTETAN DISODIUM 1 G
2 VIAL (EA) INJECTION ONCE
Refills: 0 | Status: DISCONTINUED | OUTPATIENT
Start: 2019-11-20 | End: 2019-12-30

## 2019-11-13 RX ORDER — L.ACIDOPH/B.ANIMALIS/B.LONGUM 15B CELL
0 CAPSULE ORAL
Qty: 0 | Refills: 0 | DISCHARGE

## 2019-11-13 NOTE — H&P PST ADULT - HISTORY OF PRESENT ILLNESS
48 M PMH of prior perforated appendicitis 03/2019 managed nonoperatively s/p recent hospitalization in july with worsening RLQ pain with associated N/V, diarrhea . Presents to [facility] for scheduled  Laparoscopic Appendectomy on 11/20/19.

## 2019-11-13 NOTE — H&P PST ADULT - NSICDXPROBLEM_GEN_ALL_CORE_FT
PROBLEM DIAGNOSES  Problem: Acute appendicitis with localized peritonitis  Assessment and Plan: Laprocsopic appendectomy on 11 20/19  -pre- Op Instructions discussed   -Labs sent

## 2019-11-19 ENCOUNTER — TRANSCRIPTION ENCOUNTER (OUTPATIENT)
Age: 49
End: 2019-11-19

## 2019-11-20 ENCOUNTER — OUTPATIENT (OUTPATIENT)
Dept: OUTPATIENT SERVICES | Facility: HOSPITAL | Age: 49
LOS: 1 days | End: 2019-11-20
Payer: MEDICAID

## 2019-11-20 ENCOUNTER — RESULT REVIEW (OUTPATIENT)
Age: 49
End: 2019-11-20

## 2019-11-20 ENCOUNTER — APPOINTMENT (OUTPATIENT)
Dept: TRAUMA SURGERY | Facility: HOSPITAL | Age: 49
End: 2019-11-20

## 2019-11-20 VITALS
TEMPERATURE: 99 F | OXYGEN SATURATION: 100 % | DIASTOLIC BLOOD PRESSURE: 85 MMHG | RESPIRATION RATE: 16 BRPM | HEIGHT: 72 IN | HEART RATE: 86 BPM | SYSTOLIC BLOOD PRESSURE: 135 MMHG | WEIGHT: 184.97 LBS

## 2019-11-20 VITALS — HEIGHT: 72 IN | WEIGHT: 184.97 LBS

## 2019-11-20 DIAGNOSIS — K35.30 ACUTE APPENDICITIS WITH LOCALIZED PERITONITIS, WITHOUT PERFORATION OR GANGRENE: ICD-10-CM

## 2019-11-20 PROCEDURE — 88304 TISSUE EXAM BY PATHOLOGIST: CPT | Mod: 26

## 2019-11-20 PROCEDURE — 44800 EXCISION OF BOWEL POUCH: CPT

## 2019-11-20 RX ORDER — HEPARIN SODIUM 5000 [USP'U]/ML
5000 INJECTION INTRAVENOUS; SUBCUTANEOUS EVERY 12 HOURS
Refills: 0 | Status: DISCONTINUED | OUTPATIENT
Start: 2019-11-20 | End: 2019-12-30

## 2019-11-20 RX ORDER — OXYCODONE HYDROCHLORIDE 5 MG/1
5 TABLET ORAL EVERY 4 HOURS
Refills: 0 | Status: DISCONTINUED | OUTPATIENT
Start: 2019-11-20 | End: 2019-11-20

## 2019-11-20 RX ORDER — SODIUM CHLORIDE 9 MG/ML
1000 INJECTION, SOLUTION INTRAVENOUS
Refills: 0 | Status: DISCONTINUED | OUTPATIENT
Start: 2019-11-20 | End: 2019-11-21

## 2019-11-20 RX ORDER — HYDROMORPHONE HYDROCHLORIDE 2 MG/ML
0.5 INJECTION INTRAMUSCULAR; INTRAVENOUS; SUBCUTANEOUS
Refills: 0 | Status: DISCONTINUED | OUTPATIENT
Start: 2019-11-20 | End: 2019-11-21

## 2019-11-20 RX ORDER — OXYCODONE HYDROCHLORIDE 5 MG/1
10 TABLET ORAL EVERY 6 HOURS
Refills: 0 | Status: DISCONTINUED | OUTPATIENT
Start: 2019-11-20 | End: 2019-11-21

## 2019-11-20 RX ORDER — ACETAMINOPHEN 500 MG
1000 TABLET ORAL ONCE
Refills: 0 | Status: COMPLETED | OUTPATIENT
Start: 2019-11-20 | End: 2019-11-21

## 2019-11-20 RX ORDER — ONDANSETRON 8 MG/1
4 TABLET, FILM COATED ORAL ONCE
Refills: 0 | Status: COMPLETED | OUTPATIENT
Start: 2019-11-20 | End: 2019-11-20

## 2019-11-20 RX ADMIN — SODIUM CHLORIDE 75 MILLILITER(S): 9 INJECTION, SOLUTION INTRAVENOUS at 21:23

## 2019-11-20 RX ADMIN — OXYCODONE HYDROCHLORIDE 10 MILLIGRAM(S): 5 TABLET ORAL at 22:20

## 2019-11-20 RX ADMIN — HYDROMORPHONE HYDROCHLORIDE 0.5 MILLIGRAM(S): 2 INJECTION INTRAMUSCULAR; INTRAVENOUS; SUBCUTANEOUS at 21:30

## 2019-11-20 RX ADMIN — ONDANSETRON 4 MILLIGRAM(S): 8 TABLET, FILM COATED ORAL at 22:18

## 2019-11-20 RX ADMIN — OXYCODONE HYDROCHLORIDE 10 MILLIGRAM(S): 5 TABLET ORAL at 23:00

## 2019-11-20 RX ADMIN — HYDROMORPHONE HYDROCHLORIDE 0.5 MILLIGRAM(S): 2 INJECTION INTRAMUSCULAR; INTRAVENOUS; SUBCUTANEOUS at 21:20

## 2019-11-20 NOTE — BRIEF OPERATIVE NOTE - OPERATION/FINDINGS
Laparoscopic appendectomy s/p perforated appendicitis with incidental finding of Meckel's diverticulum with resection.

## 2019-11-20 NOTE — BRIEF OPERATIVE NOTE - NSICDXBRIEFPREOP_GEN_ALL_CORE_FT
H&P Update: 
Cheryl Abernathy was seen and examined. No changes from h&p except with shave spur after excision of soft tissue mass Signed By: Pamela Sánchez DPM   
 March 4, 2019 1:53 PM   
 

PRE-OP DIAGNOSIS:  Perforated appendicitis 20-Nov-2019 20:42:27  Blanca Kay

## 2019-11-21 ENCOUNTER — TRANSCRIPTION ENCOUNTER (OUTPATIENT)
Age: 49
End: 2019-11-21

## 2019-11-21 VITALS
RESPIRATION RATE: 16 BRPM | DIASTOLIC BLOOD PRESSURE: 81 MMHG | HEART RATE: 88 BPM | SYSTOLIC BLOOD PRESSURE: 131 MMHG | OXYGEN SATURATION: 98 %

## 2019-11-21 PROCEDURE — 44970 LAPAROSCOPY APPENDECTOMY: CPT

## 2019-11-21 PROCEDURE — 88304 TISSUE EXAM BY PATHOLOGIST: CPT

## 2019-11-21 PROCEDURE — C1889: CPT

## 2019-11-21 RX ORDER — OXYCODONE HYDROCHLORIDE 5 MG/1
1 TABLET ORAL
Qty: 10 | Refills: 0
Start: 2019-11-21

## 2019-11-21 RX ORDER — ACETAMINOPHEN 500 MG
1000 TABLET ORAL EVERY 6 HOURS
Refills: 0 | Status: DISCONTINUED | OUTPATIENT
Start: 2019-11-21 | End: 2019-12-30

## 2019-11-21 RX ORDER — IBUPROFEN 200 MG
600 TABLET ORAL EVERY 6 HOURS
Refills: 0 | Status: DISCONTINUED | OUTPATIENT
Start: 2019-11-21 | End: 2019-12-30

## 2019-11-21 RX ORDER — ACETAMINOPHEN 500 MG
2 TABLET ORAL
Qty: 0 | Refills: 0 | DISCHARGE
Start: 2019-11-21

## 2019-11-21 RX ORDER — LANOLIN ALCOHOL/MO/W.PET/CERES
5 CREAM (GRAM) TOPICAL AT BEDTIME
Refills: 0 | Status: DISCONTINUED | OUTPATIENT
Start: 2019-11-21 | End: 2019-12-30

## 2019-11-21 RX ORDER — OXYCODONE HYDROCHLORIDE 5 MG/1
5 TABLET ORAL EVERY 4 HOURS
Refills: 0 | Status: DISCONTINUED | OUTPATIENT
Start: 2019-11-21 | End: 2019-11-21

## 2019-11-21 RX ORDER — IBUPROFEN 200 MG
1 TABLET ORAL
Qty: 0 | Refills: 0 | DISCHARGE
Start: 2019-11-21

## 2019-11-21 RX ADMIN — OXYCODONE HYDROCHLORIDE 10 MILLIGRAM(S): 5 TABLET ORAL at 10:00

## 2019-11-21 RX ADMIN — Medication 400 MILLIGRAM(S): at 00:32

## 2019-11-21 RX ADMIN — Medication 5 MILLIGRAM(S): at 03:47

## 2019-11-21 RX ADMIN — HEPARIN SODIUM 5000 UNIT(S): 5000 INJECTION INTRAVENOUS; SUBCUTANEOUS at 06:00

## 2019-11-21 RX ADMIN — Medication 1000 MILLIGRAM(S): at 01:00

## 2019-11-21 NOTE — DISCHARGE NOTE NURSING/CASE MANAGEMENT/SOCIAL WORK - PATIENT PORTAL LINK FT
You can access the FollowMyHealth Patient Portal offered by [facility] by registering at the following website: [email or web address]. By joining [facility]’s FollowMyHealth portal, you will also be able to view your health information using other applications (apps) compatible with our system.

## 2019-11-21 NOTE — CHART NOTE - NSCHARTNOTEFT_GEN_A_CORE
POST-OPERATIVE NOTE    Subjective:  Patient is s/p Laparoscopic appendectomy for perforated appendicitis. In PACU, patient is emotionally distraught at not having family at bedside. Denies nausea, vomiting, chest pain, sob, fevers chills. Pain is well controlled    Vital Signs Last 24 Hrs  T(C): 36.5 (20 Nov 2019 22:20), Max: 37.2 (20 Nov 2019 13:22)  T(F): 97.7 (20 Nov 2019 22:20), Max: 99 (20 Nov 2019 13:22)  HR: 117 (20 Nov 2019 23:00) (75 - 117)  BP: 147/85 (20 Nov 2019 23:00) (124/82 - 149/102)  BP(mean): 104 (20 Nov 2019 22:30) (97 - 121)  RR: 18 (20 Nov 2019 23:00) (16 - 18)  SpO2: 100% (20 Nov 2019 23:00) (97% - 100%)  I&O's Detail    20 Nov 2019 07:01  -  21 Nov 2019 00:07  --------------------------------------------------------  IN:    lactated ringers.: 300 mL  Total IN: 300 mL    OUT:  Total OUT: 0 mL    Total NET: 300 mL          Physical Exam:  General: NAD, hyperactive in bed  Pulmonary: Nonlabored breathing, no respiratory distress  Cardiovascular: NSR  Abdominal: soft, NT/ND, Port site dressings intact and appropriately saturated.  Extremities: WWP                Assessment:  The patient is a 49y Male who is s/p Laparoscopic appendectomy for perforated appendicitis. Patient recovering in PACU    Plan:  - Pain control as needed  - DVT ppx with SubQ Hep  - OOB and ambulating as tolerated  - D/C home in AM    ACS & Trauma  [id number]

## 2019-11-21 NOTE — PROGRESS NOTE ADULT - ASSESSMENT
A/P: 50yo male POD1 s/p laparoscopic appendectomy for perforated appendicitis  -d/c to home from PACU  -Analgesia   -WBAT

## 2019-11-21 NOTE — DISCHARGE NOTE PROVIDER - CARE PROVIDER_API CALL
Lalo Moreno)  Surgery; Surgical Critical Care  [address], [address]  [place] [address]  Phone: [phone]  Fax: [phone]  Follow Up Time: 1 week    Man Dinero)  Family Medicine  [address] [place] [address], [address] [place] [address]  Phone: [phone]  Fax: [phone]  Follow Up Time: 1 week

## 2019-11-21 NOTE — DISCHARGE NOTE PROVIDER - CARE PROVIDERS DIRECT ADDRESSES
[email or web address].\[email or web address]\""[email or web address]

## 2019-11-21 NOTE — DISCHARGE NOTE PROVIDER - NSDCFUADDINST_GEN_ALL_CORE_FT
You may shower. After showering, pat steri strips dry. Leave the white steri strips in place, they will fall off on their own in approximately 5-7 days.

## 2019-11-21 NOTE — DISCHARGE NOTE PROVIDER - NSDCCPCAREPLAN_GEN_ALL_CORE_FT
PRINCIPAL DISCHARGE DIAGNOSIS  Diagnosis: Perforated appendicitis  Assessment and Plan of Treatment: Follow up with Dr. Moreno in 1-2 weeks. Please call [phone] to schedule an appointment  [address]  [address], [address]   NOTIFY YOUR SURGEON IF: You have any bleeding that does not stop, any pus draining from your wound, any fever (over 100.4 F) or chills, persistent nausea/vomiting, persistent diarrhea, or if your pain is not controlled on your discharge pain medications.

## 2019-11-21 NOTE — DISCHARGE NOTE PROVIDER - NSDCMRMEDTOKEN_GEN_ALL_CORE_FT
acetaminophen 500 mg oral tablet: 2 tab(s) orally every 6 hours  amoxicillin-clavulanate 875 mg-125 mg oral tablet: 875 milligram(s) orally every 12 hours   ibuprofen 600 mg oral tablet: 1 tab(s) orally every 6 hours  oxyCODONE 5 mg oral tablet: 1 tab(s) orally every 4 hours, As needed, Moderate Pain (4 - 6) MDD:6 acetaminophen 500 mg oral tablet: 2 tab(s) orally every 6 hours  ibuprofen 600 mg oral tablet: 1 tab(s) orally every 6 hours  oxyCODONE 5 mg oral tablet: 1 tab(s) orally every 4 hours, As needed, Moderate Pain (4 - 6) MDD:6

## 2019-11-21 NOTE — PROGRESS NOTE ADULT - SUBJECTIVE AND OBJECTIVE BOX
Surgery Progress Note     Subjective/24hour Events:   Patient seen and examined.   No acute events overnight.   Pain controlled.     Vital Signs:  Vital Signs Last 24 Hrs  T(C): 36.5 (21 Nov 2019 06:00), Max: 37.2 (20 Nov 2019 13:22)  T(F): 97.7 (21 Nov 2019 06:00), Max: 99 (20 Nov 2019 13:22)  HR: 88 (21 Nov 2019 09:00) (75 - 117)  BP: 131/81 (21 Nov 2019 09:00) (123/75 - 149/102)  BP(mean): 99 (21 Nov 2019 09:00) (93 - 121)  RR: 16 (21 Nov 2019 09:00) (16 - 18)  SpO2: 98% (21 Nov 2019 09:00) (97% - 100%)    CAPILLARY BLOOD GLUCOSE          I&O's Detail    20 Nov 2019 07:01  -  21 Nov 2019 07:00  --------------------------------------------------------  IN:    IV PiggyBack: 100 mL    lactated ringers.: 525 mL    Oral Fluid: 2630 mL  Total IN: 3255 mL    OUT:    Voided: 2880 mL  Total OUT: 2880 mL    Total NET: 375 mL      21 Nov 2019 07:01  -  21 Nov 2019 09:52  --------------------------------------------------------  IN:  Total IN: 0 mL    OUT:  Total OUT: 0 mL    Total NET: 0 mL          MEDICATIONS  (STANDING):  acetaminophen   Tablet .. 1000 milliGRAM(s) Oral every 6 hours  cefoTEtan  IVPB 2 Gram(s) IV Intermittent once  heparin  Injectable 5000 Unit(s) SubCutaneous every 12 hours  ibuprofen  Tablet. 600 milliGRAM(s) Oral every 6 hours  melatonin 5 milliGRAM(s) Oral at bedtime    MEDICATIONS  (PRN):  HYDROmorphone  Injectable 0.5 milliGRAM(s) IV Push every 10 minutes PRN Moderate Pain (4 - 6)  oxyCODONE    IR 10 milliGRAM(s) Oral every 6 hours PRN Severe Pain (7 - 10)  oxyCODONE    IR 5 milliGRAM(s) Oral every 4 hours PRN Moderate Pain (4 - 6)      Physical Exam:  Gen: NAD.  Lungs: Non labored breathing.   Ab: Soft, nontender, nondistended.   Ext: Moves all 4 spontaneously.

## 2019-11-21 NOTE — DISCHARGE NOTE PROVIDER - NSDCACTIVITY_GEN_ALL_CORE
Walking - Indoors allowed/No heavy lifting/straining/Stairs allowed/Walking - Outdoors allowed/Showering allowed

## 2019-11-21 NOTE — DISCHARGE NOTE NURSING/CASE MANAGEMENT/SOCIAL WORK - NSFLUVACAGEDISCH_IMM_ALL_CORE
Client arrived to take the MMPI.  There were no reported difficulties with taking the test.  Client will receive results of the testing once the evaluation is completed.   Adult

## 2019-11-21 NOTE — DISCHARGE NOTE PROVIDER - PROVIDER TOKENS
PROVIDER:[TOKEN:[[id number]],FOLLOWUP:[1 week]],PROVIDER:[TOKEN:[[id number]],FOLLOWUP:[1 week]]

## 2019-11-21 NOTE — DISCHARGE NOTE PROVIDER - HOSPITAL COURSE
48 M PMH of prior perforated appendicitis 03/2019 managed nonoperatively s/p recent hospitalization in july with worsening RLQ pain with associated N/V, diarrhea. Pt presented for interval laparoscopic appendectomy. He tolerated the procedure well. was transferred to PACU in stable condition. Diet was advanced as tolerated. Pain medication transitioned from IV to PO.         Pt currently tolerating a regular diet, ambulating, voiding and pain controlled. Pt stable for discharge to home. He was instructed to follow up with Dr. Moreno and PCP Dr. Dinero in 1-2 weeks.

## 2019-11-27 LAB — SURGICAL PATHOLOGY STUDY: SIGNIFICANT CHANGE UP

## 2019-12-17 ENCOUNTER — APPOINTMENT (OUTPATIENT)
Dept: TRAUMA SURGERY | Facility: CLINIC | Age: 49
End: 2019-12-17
Payer: MEDICAID

## 2019-12-17 VITALS — DIASTOLIC BLOOD PRESSURE: 70 MMHG | HEART RATE: 70 BPM | SYSTOLIC BLOOD PRESSURE: 116 MMHG

## 2019-12-17 VITALS — WEIGHT: 181 LBS | HEIGHT: 72 IN | BODY MASS INDEX: 24.52 KG/M2 | TEMPERATURE: 98.2 F

## 2019-12-17 PROCEDURE — 99024 POSTOP FOLLOW-UP VISIT: CPT

## 2019-12-17 NOTE — HISTORY OF PRESENT ILLNESS
[de-identified] : Mr. Barnes returns for postoperative follow up after interval lap appy with incidental Meckel's diverticulectomy last month. He is feeling well, has no complaints. Is eating normally, having normal bowel function. No fevers or chills. His only complaint is some mild short-term memory loss. No dizziness, headaches or other complaints.\par \par No distress, very pleasant\par Abd soft, nontender, nondistended, well-healed lap appy incisions\par \par We discussed his pathology and the incidental finding of Meckel's diverticulum and why I decided to remove it. I encouraged him to see his PMD for the short term memory loss if it persists. Return to office prn.

## 2020-01-01 NOTE — ED ADULT NURSE REASSESSMENT NOTE - NS ED NURSE REASSESS COMMENT FT1
Chief Complaint   Patient presents with    Other     weight check      1. Have you been to the ER, urgent care clinic since your last visit? Hospitalized since your last visit? No    2. Have you seen or consulted any other health care providers outside of the [address] [place] [address] since your last visit? Include any pap smears or colon screening. No     Reviewed record in preparation for visit and have obtained necessary documentation. Pt eating pizza when RN walked in, Rn explained to pt that he in NPO and to stop eating, Pt verbalized understanding. MD manning aware.

## 2020-03-16 ENCOUNTER — APPOINTMENT (OUTPATIENT)
Dept: FAMILY MEDICINE | Facility: CLINIC | Age: 50
End: 2020-03-16
Payer: MEDICAID

## 2020-03-16 ENCOUNTER — LABORATORY RESULT (OUTPATIENT)
Age: 50
End: 2020-03-16

## 2020-03-16 VITALS
BODY MASS INDEX: 26.41 KG/M2 | SYSTOLIC BLOOD PRESSURE: 140 MMHG | DIASTOLIC BLOOD PRESSURE: 84 MMHG | WEIGHT: 195 LBS | HEART RATE: 91 BPM | HEIGHT: 72 IN | OXYGEN SATURATION: 95 % | RESPIRATION RATE: 18 BRPM

## 2020-03-16 DIAGNOSIS — Z72.0 TOBACCO USE: ICD-10-CM

## 2020-03-16 PROCEDURE — 99406 BEHAV CHNG SMOKING 3-10 MIN: CPT

## 2020-03-16 PROCEDURE — 99396 PREV VISIT EST AGE 40-64: CPT | Mod: 25

## 2020-03-16 PROCEDURE — 36415 COLL VENOUS BLD VENIPUNCTURE: CPT

## 2020-03-16 PROCEDURE — 99214 OFFICE O/P EST MOD 30 MIN: CPT | Mod: 25

## 2020-03-16 RX ORDER — POLYETHYLENE GLYCOL 3350, SODIUM CHLORIDE, SODIUM BICARBONATE AND POTASSIUM CHLORIDE WITH LEMON FLAVOR 420; 11.2; 5.72; 1.48 G/4L; G/4L; G/4L; G/4L
420 POWDER, FOR SOLUTION ORAL
Qty: 1 | Refills: 0 | Status: DISCONTINUED | COMMUNITY
Start: 2019-08-20 | End: 2020-03-16

## 2020-03-16 RX ORDER — SILDENAFIL CITRATE 50 MG/1
50 TABLET, FILM COATED ORAL
Qty: 7 | Refills: 0 | Status: DISCONTINUED | COMMUNITY
Start: 2017-12-06 | End: 2020-03-16

## 2020-03-16 NOTE — COUNSELING
[Smoking cessation counseling provided] : smoking cessation [Behavioral health counseling provided] : behavioral health  [Quit Smoking] : Quit smoking [Engage in a relaxing activity] : Engage in a relaxing activity [None] : None [Good understanding] : Patient has a good understanding of lifestyle changes and the steps needed to achieve self management goals

## 2020-03-16 NOTE — ASSESSMENT
[FreeTextEntry1] : Patient was counseled on healthy eating habits, on daily exercise and stress relief. All medications and allergies were reviewed with the patient and any adjustments necessary were made. Patient was counseled to try engage in an exercise activity for at least 30 minutes 3-4 times a week.  Patient was advised to eat a diet low in fat and carbohydrates and high in protein, with plenty of vegetables. Patient was advised to try and engage in relaxing activities whenever possible.\par The patients blood was draw in office and will be followed and assessed for any issues.  Patient was told to return to the office if any issues arise.  Unless otherwise stated, the patient is to continue all other medications as previously prescribed.\par \par anxiety\par Discussed diagnosis of anxiety and depression with the patient and potential outcomes/side affects of treatment versus non treatment. Medications were assessed and described at length. Side affects and black box warning were discussed.  Patient was advised to continue will all medications prescribed and the need for compliance was discussed and emphasized. Patient was advised to not stop medications without discussing with a health care provider first. Patient was advised to continue psychotherapy or seek therapy if not currently attending. Patient was educated on addictive potential of controlled substances and was counseled to use only as needed and sparingly. Patient verbalized understanding of all the above.\par severely anxious\par cannot sleep, always anxious, drinking excessively\par \par etoh abuse\par work on anxiety, will reassess\par \par cholesterol\par  Blood work was drawn in office and results will be reviewed and followed. The patient was counseled on a low cholesterol diet and on medication compliance. Patient was advised to generally limit foods fried in oil, high in saturated fat, cheese, eggs and red meat. Patient was advised to continue on current medications and to followup in office for necessary blood work in three months.\par \par smoking\par Smoking cessation was discussed with patient. All options were explained to the patient including available medications and their mechanism of action. Side affects and black box warnings were discussed as well. Patient was counseled on risks and benefits of available medications and risks of continued smoking including death. Greater than 5 minutes spent counseling patient.\par smoking 1 ppd, excessive due to anxiety\par \par hand pain\par refer to neurologist\par failed hand surgeon

## 2020-03-16 NOTE — HISTORY OF PRESENT ILLNESS
[de-identified] : 49 year old male  here for annual well visit. Patient's blood work was drawn and medications reviewed. Patient's past medical history was reviewed, allergies verified and problems were identified and assessed. Patients medications were reviewed. \par \par patient is severely stressed, drinking a lot, smoking a lot\par

## 2020-03-16 NOTE — HEALTH RISK ASSESSMENT
[Excellent] : ~his/her~ current health as excellent [Good] : ~his/her~  mood as  good [] : Yes [Yes] : Yes [4 or more  times a week (4 pts)] : 4 or more  times a week (4 points) [7 to 9 (3 pts)] : 7 to 9 (3  points) [Daily or almost daily (4 pts)] : Daily or almost daily (4 points) [No falls in past year] : Patient reported no falls in the past year [0] : 2) Feeling down, depressed, or hopeless: Not at all (0) [Discussed at today's visit] : Advance Directives Discussed at today's visit [de-identified] : e [[id number]] : 0

## 2020-03-16 NOTE — PHYSICAL EXAM
[Well Nourished] : well nourished [Well Developed] : well developed [Normal Oropharynx] : the oropharynx was normal [Supple] : supple [Regular Rhythm] : with a regular rhythm [Normal S1, S2] : normal S1 and S2 [No Abdominal Bruit] : a ~M bruit was not heard ~T in the abdomen [Soft] : abdomen soft

## 2020-03-17 ENCOUNTER — OUTPATIENT (OUTPATIENT)
Dept: OUTPATIENT SERVICES | Facility: HOSPITAL | Age: 50
LOS: 1 days | End: 2020-03-17
Payer: MEDICAID

## 2020-03-17 ENCOUNTER — RESULT REVIEW (OUTPATIENT)
Age: 50
End: 2020-03-17

## 2020-03-17 ENCOUNTER — APPOINTMENT (OUTPATIENT)
Dept: ULTRASOUND IMAGING | Facility: CLINIC | Age: 50
End: 2020-03-17
Payer: MEDICAID

## 2020-03-17 DIAGNOSIS — B19.20 UNSPECIFIED VIRAL HEPATITIS C WITHOUT HEPATIC COMA: ICD-10-CM

## 2020-03-17 LAB
ALBUMIN SERPL ELPH-MCNC: 5 G/DL
ALP BLD-CCNC: 62 U/L
ALT SERPL-CCNC: 30 U/L
ANION GAP SERPL CALC-SCNC: 15 MMOL/L
APPEARANCE: CLEAR
AST SERPL-CCNC: 28 U/L
BASOPHILS # BLD AUTO: 0.05 K/UL
BASOPHILS NFR BLD AUTO: 0.6 %
BILIRUB SERPL-MCNC: 0.5 MG/DL
BILIRUBIN URINE: NEGATIVE
BLOOD URINE: NEGATIVE
BUN SERPL-MCNC: 11 MG/DL
C TRACH RRNA SPEC QL NAA+PROBE: NOT DETECTED
CALCIUM SERPL-MCNC: 9.9 MG/DL
CHLORIDE SERPL-SCNC: 100 MMOL/L
CHOLEST SERPL-MCNC: 287 MG/DL
CHOLEST/HDLC SERPL: 3.7 RATIO
CO2 SERPL-SCNC: 26 MMOL/L
COLOR: NORMAL
CREAT SERPL-MCNC: 0.87 MG/DL
EOSINOPHIL # BLD AUTO: 0.08 K/UL
EOSINOPHIL NFR BLD AUTO: 1 %
ESTIMATED AVERAGE GLUCOSE: 108 MG/DL
GLUCOSE QUALITATIVE U: NEGATIVE
GLUCOSE SERPL-MCNC: 118 MG/DL
HAV IGM SER QL: NONREACTIVE
HBA1C MFR BLD HPLC: 5.4 %
HBV CORE IGM SER QL: NONREACTIVE
HBV SURFACE AG SER QL: NONREACTIVE
HCT VFR BLD CALC: 50 %
HCV AB SER QL: ABNORMAL
HCV S/CO RATIO: 1.97 S/CO
HDLC SERPL-MCNC: 78 MG/DL
HGB BLD-MCNC: 15.7 G/DL
HIV1+2 AB SPEC QL IA.RAPID: NONREACTIVE
HSV 1+2 IGG SER IA-IMP: NEGATIVE
HSV 1+2 IGG SER IA-IMP: POSITIVE
HSV1 IGG SER QL: 32.3 INDEX
HSV2 IGG SER QL: 0.16 INDEX
IMM GRANULOCYTES NFR BLD AUTO: 0.4 %
KETONES URINE: NEGATIVE
LDLC SERPL CALC-MCNC: 171 MG/DL
LEUKOCYTE ESTERASE URINE: NEGATIVE
LYMPHOCYTES # BLD AUTO: 2.07 K/UL
LYMPHOCYTES NFR BLD AUTO: 25.5 %
MAN DIFF?: NORMAL
MCHC RBC-ENTMCNC: 29.8 PG
MCHC RBC-ENTMCNC: 31.4 GM/DL
MCV RBC AUTO: 95.1 FL
MONOCYTES # BLD AUTO: 0.7 K/UL
MONOCYTES NFR BLD AUTO: 8.6 %
N GONORRHOEA RRNA SPEC QL NAA+PROBE: NOT DETECTED
NEUTROPHILS # BLD AUTO: 5.2 K/UL
NEUTROPHILS NFR BLD AUTO: 63.9 %
NITRITE URINE: NEGATIVE
PH URINE: 6.5
PLATELET # BLD AUTO: 387 K/UL
POTASSIUM SERPL-SCNC: 4.4 MMOL/L
PROT SERPL-MCNC: 7.7 G/DL
PROTEIN URINE: NEGATIVE
RBC # BLD: 5.26 M/UL
RBC # FLD: 13.6 %
SODIUM SERPL-SCNC: 141 MMOL/L
SOURCE AMPLIFICATION: NORMAL
SPECIFIC GRAVITY URINE: 1.01
T PALLIDUM AB SER QL IA: NEGATIVE
TRIGL SERPL-MCNC: 186 MG/DL
TSH SERPL-ACNC: 1.47 UIU/ML
UROBILINOGEN URINE: NORMAL
WBC # FLD AUTO: 8.13 K/UL

## 2020-03-17 PROCEDURE — 76705 ECHO EXAM OF ABDOMEN: CPT

## 2020-03-17 PROCEDURE — 76705 ECHO EXAM OF ABDOMEN: CPT | Mod: 26

## 2020-04-07 ENCOUNTER — RX RENEWAL (OUTPATIENT)
Age: 50
End: 2020-04-07

## 2020-04-08 ENCOUNTER — APPOINTMENT (OUTPATIENT)
Dept: FAMILY MEDICINE | Facility: CLINIC | Age: 50
End: 2020-04-08
Payer: MEDICAID

## 2020-04-08 PROCEDURE — 99214 OFFICE O/P EST MOD 30 MIN: CPT | Mod: 95

## 2020-04-08 NOTE — PHYSICAL EXAM
[No Acute Distress] : no acute distress [Well Nourished] : well nourished [Well Developed] : well developed [Normal Voice/Communication] : normal voice/communication [No Respiratory Distress] : no respiratory distress  [No Rash] : no rash [Normal Affect] : the affect was normal [Normal Insight/Judgement] : insight and judgment were intact

## 2020-04-08 NOTE — ASSESSMENT
[FreeTextEntry1] : anxiety\par Discussed diagnosis of anxiety and depression with the patient and potential outcomes/side affects of treatment versus non treatment. Medications were assessed and described at length. Side affects and black box warning were discussed.  Patient was advised to continue will all medications prescribed and the need for compliance was discussed and emphasized. Patient was advised to not stop medications without discussing with a health care provider first. Patient was advised to continue psychotherapy or seek therapy if not currently attending. Patient was educated on addictive potential of controlled substances and was counseled to use only as needed and sparingly. Patient verbalized understanding of all the above.\par severely anxious\par cannot sleep, always anxious, drinking excessively\par tried and failed zoloft, only wants xanax as needed\par will order\par \par hep c\par reviewed bw with patient\par \par etoh abuse\par will try curb\par \par \par cholesterol\par The patient was counseled on a low cholesterol diet and on medication compliance. Patient was advised to generally limit foods fried in oil, high in saturated fat, cheese, eggs and red meat. Patient was advised to continue on current medications and to followup in office for necessary blood work in three months.\par finally taking medications\par \par smoking\par Smoking cessation was discussed with patient. All options were explained to the patient including available medications and their mechanism of action. Side affects and black box warnings were discussed as well. Patient was counseled on risks and benefits of available medications and risks of continued smoking including death. Greater than 5 minutes spent counseling patient.\par smoking 1 ppd, excessive due to anxiety\par \par hand pain\par refer to neurologist\par failed hand surgeon

## 2020-04-08 NOTE — HISTORY OF PRESENT ILLNESS
[Home] : at home, [unfilled] , at the time of the visit. [Medical Office: ([facility])___] : at ~his/her~ medical office located in [place] [Patient] : the patient [Self] : self [de-identified] : 49 year old male is here for a followup visit. Patient is here for medication renewals and for blood work discussion. Medications and allergies were reviewed and assessed.  There has been no new medications since the last visit. Patient is feeling well with no active changes or issues since His last visit.\par \par \par patient is severely stressed, drinking a lot, smoking a lot\par

## 2020-04-08 NOTE — HEALTH RISK ASSESSMENT
[] : Yes [Yes] : Yes [4 or more  times a week (4 pts)] : 4 or more  times a week (4 points) [7 to 9 (3 pts)] : 7 to 9 (3  points) [Daily or almost daily (4 pts)] : Daily or almost daily (4 points) [No falls in past year] : Patient reported no falls in the past year [0] : 2) Feeling down, depressed, or hopeless: Not at all (0) [[id number]] : 0 [Excellent] : ~his/her~ current health as excellent [Good] : ~his/her~  mood as  good [Discussed at today's visit] : Advance Directives Discussed at today's visit

## 2020-05-12 ENCOUNTER — RX RENEWAL (OUTPATIENT)
Age: 50
End: 2020-05-12

## 2020-07-01 ENCOUNTER — APPOINTMENT (OUTPATIENT)
Dept: FAMILY MEDICINE | Facility: CLINIC | Age: 50
End: 2020-07-01
Payer: MEDICAID

## 2020-07-01 PROCEDURE — 99214 OFFICE O/P EST MOD 30 MIN: CPT | Mod: 95

## 2020-07-01 NOTE — COUNSELING
[Behavioral health counseling provided] : behavioral health  [Smoking cessation counseling provided] : smoking cessation [Engage in a relaxing activity] : Engage in a relaxing activity [Quit Smoking] : Quit smoking [Good understanding] : Patient has a good understanding of lifestyle changes and the steps needed to achieve self management goals [None] : None

## 2020-07-01 NOTE — HEALTH RISK ASSESSMENT
[] : Yes [Yes] : Yes [4 or more  times a week (4 pts)] : 4 or more  times a week (4 points) [7 to 9 (3 pts)] : 7 to 9 (3  points) [No falls in past year] : Patient reported no falls in the past year [Daily or almost daily (4 pts)] : Daily or almost daily (4 points) [[id number]] : 0 [0] : 2) Feeling down, depressed, or hopeless: Not at all (0) [Good] : ~his/her~  mood as  good [Excellent] : ~his/her~ current health as excellent [Discussed at today's visit] : Advance Directives Discussed at today's visit

## 2020-07-01 NOTE — HISTORY OF PRESENT ILLNESS
[de-identified] : 49 year old male is here for a followup visit. Patient is here for medication renewals and for blood work discussion. Medications and allergies were reviewed and assessed.  There has been no new medications since the last visit. Patient is feeling well with no active changes or issues since His last visit.\par \par \par patient is severely stressed, drinking a lot, smoking a lot\par  [Home] : at home, [unfilled] , at the time of the visit. [Medical Office: ([facility])___] : at ~his/her~ medical office located in [place] [Patient] : the patient [Self] : self

## 2020-07-01 NOTE — PHYSICAL EXAM
[Well Developed] : well developed [No Acute Distress] : no acute distress [Well Nourished] : well nourished [No Respiratory Distress] : no respiratory distress  [Normal Voice/Communication] : normal voice/communication [Normal Affect] : the affect was normal [No Rash] : no rash [Normal Insight/Judgement] : insight and judgment were intact

## 2020-07-01 NOTE — ASSESSMENT
[FreeTextEntry1] : The current situation with COVID 19 was discussed with the patient.  Currently, the patient is concerned with coming into the office, understandably.  Patient does not want to go to lab either at this time.  Reviewed with the patient his previous blood work and answered all questions. Will defer blood work till next visit, and patient will schedule follow up appointment.\par \par anxiety\par Discussed diagnosis of anxiety and depression with the patient and potential outcomes/side affects of treatment versus non treatment. Medications were assessed and described at length. Side affects and black box warning were discussed.  Patient was advised to continue will all medications prescribed and the need for compliance was discussed and emphasized. Patient was advised to not stop medications without discussing with a health care provider first. Patient was advised to continue psychotherapy or seek therapy if not currently attending. Patient was educated on addictive potential of controlled substances and was counseled to use only as needed and sparingly. Patient verbalized understanding of all the above.\par severely anxious\par cannot sleep, always anxious, drinking excessively\par tried and failed zoloft, only wants xanax as needed\par increased anxiety due to covid, wishes to have increased dosage, will give more quantity so can be better paced\par \par hep c\par reviewed bw with patient\par \par etoh abuse\par will try curb\par \par cholesterol\par The patient was counseled on a low cholesterol diet and on medication compliance. Patient was advised to generally limit foods fried in oil, high in saturated fat, cheese, eggs and red meat. Patient was advised to continue on current medications and to followup in office for necessary blood work in three months.\par finally taking medications\par \par smoking\par Smoking cessation was discussed with patient. All options were explained to the patient including available medications and their mechanism of action. Side affects and black box warnings were discussed as well. Patient was counseled on risks and benefits of available medications and risks of continued smoking including death. Greater than 5 minutes spent counseling patient.\par smoking 1 ppd, excessive due to anxiety\par \par hand pain\par refer to neurologist\par failed hand surgeon

## 2020-12-21 PROBLEM — Z12.11 ENCOUNTER FOR SCREENING COLONOSCOPY: Status: RESOLVED | Noted: 2019-08-20 | Resolved: 2020-12-21

## 2021-03-09 ENCOUNTER — RX RENEWAL (OUTPATIENT)
Age: 51
End: 2021-03-09

## 2021-04-05 NOTE — PATIENT PROFILE ADULT - NSPRONUTRITIONRISK_GEN_A_NUR
Patient Name: Patrick Roberts  Specialist or PCP: Dr. NEELA Caceres  Symptoms: penis issue. Caller (wife) would not discuss any further.   Best Availability: any  Callback Number: [phone] (G)    Thank you,  Elin Newton   No indicators present

## 2021-04-11 ENCOUNTER — RX RENEWAL (OUTPATIENT)
Age: 51
End: 2021-04-11

## 2021-07-05 ENCOUNTER — APPOINTMENT (OUTPATIENT)
Dept: FAMILY MEDICINE | Facility: CLINIC | Age: 51
End: 2021-07-05

## 2021-07-09 ENCOUNTER — APPOINTMENT (OUTPATIENT)
Dept: FAMILY MEDICINE | Facility: CLINIC | Age: 51
End: 2021-07-09
Payer: MEDICAID

## 2021-07-09 DIAGNOSIS — B19.20 UNSPECIFIED VIRAL HEPATITIS C W/OUT HEPATIC COMA: ICD-10-CM

## 2021-07-09 PROCEDURE — 99213 OFFICE O/P EST LOW 20 MIN: CPT | Mod: 95

## 2021-07-09 RX ORDER — SERTRALINE HYDROCHLORIDE 100 MG/1
100 TABLET, FILM COATED ORAL
Qty: 30 | Refills: 0 | Status: DISCONTINUED | COMMUNITY
Start: 2020-03-16 | End: 2021-07-09

## 2021-07-09 NOTE — HEALTH RISK ASSESSMENT
[] : Yes [Yes] : Yes [4 or more  times a week (4 pts)] : 4 or more  times a week (4 points) [7 to 9 (3 pts)] : 7 to 9 (3  points) [Daily or almost daily (4 pts)] : Daily or almost daily (4 points) [No falls in past year] : Patient reported no falls in the past year [0] : 2) Feeling down, depressed, or hopeless: Not at all (0) [[id number]] : 0 [Excellent] : ~his/her~ current health as excellent [Good] : ~his/her~  mood as  good [Discussed at today's visit] : Advance Directives Discussed at today's visit

## 2021-07-09 NOTE — REVIEW OF SYSTEMS
[Insomnia] : insomnia [Anxiety] : anxiety [Negative] : Heme/Lymph [de-identified] : acute on chronic

## 2021-07-09 NOTE — ASSESSMENT
[FreeTextEntry1] : \par anxiety\par Discussed diagnosis of anxiety and depression with the patient and potential outcomes/side affects of treatment versus non treatment. Medications were assessed and described at length. Side affects and black box warning were discussed.  Patient was advised to continue will all medications prescribed and the need for compliance was discussed and emphasized. Patient was advised to not stop medications without discussing with a health care provider first. Patient was advised to continue psychotherapy or seek therapy if not currently attending. Patient was educated on addictive potential of controlled substances and was counseled to use only as needed and sparingly. Patient verbalized understanding of all the above.\par acute anxiety due to breakup with girlfriend who cheated on him with his best friend,\par severely anxious, cannot sleep, always anxious, drinking excessively\par tried and failed zoloft, only wants xanax as needed\par has used xanax in the past with success, last used july 2020 for breakup with different partner\par if symptoms worsen or do not resolve must return to office for \par \par hep c\par reviewed bw with patient\par \par etoh abuse\par will try curb\par \par cholesterol\par The patient was counseled on a low cholesterol diet and on medication compliance. Patient was advised to generally limit foods fried in oil, high in saturated fat, cheese, eggs and red meat. Patient was advised to continue on current medications and to followup in office for necessary blood work in three months.\par finally taking medications\par \par smoking\par Smoking cessation was discussed with patient. All options were explained to the patient including available medications and their mechanism of action. Side affects and black box warnings were discussed as well. Patient was counseled on risks and benefits of available medications and risks of continued smoking including death. Greater than 5 minutes spent counseling patient.\par smoking 1 ppd, excessive due to anxiety\par \par hand pain\par refer to neurologist\par failed hand surgeon

## 2021-07-09 NOTE — HISTORY OF PRESENT ILLNESS
[de-identified] : 51 year old male is here for a followup visit. Patient is here for medication renewals and for blood work discussion. Medications and allergies were reviewed and assessed.  There has been no new medications since the last visit. Patient is feeling well with no active changes or issues since His last visit.\par \par \par patient is severely stressed, drinking a lot, smoking a lot\par girlfriend broke up with him and cheated with his best friend\par  [Home] : at home, [unfilled] , at the time of the visit. [Medical Office: ([facility])___] : at ~his/her~ medical office located in [place] [Patient] : the patient [Self] : self

## 2021-07-19 ENCOUNTER — APPOINTMENT (OUTPATIENT)
Dept: FAMILY MEDICINE | Facility: CLINIC | Age: 51
End: 2021-07-19
Payer: MEDICAID

## 2021-07-19 ENCOUNTER — NON-APPOINTMENT (OUTPATIENT)
Age: 51
End: 2021-07-19

## 2021-07-19 PROCEDURE — 99214 OFFICE O/P EST MOD 30 MIN: CPT | Mod: 95

## 2021-07-19 NOTE — HEALTH RISK ASSESSMENT
[] : Yes [Yes] : Yes [4 or more  times a week (4 pts)] : 4 or more  times a week (4 points) [7 to 9 (3 pts)] : 7 to 9 (3  points) [Daily or almost daily (4 pts)] : Daily or almost daily (4 points) [No falls in past year] : Patient reported no falls in the past year [0] : 2) Feeling down, depressed, or hopeless: Not at all (0) [[id number]] : 0 [Excellent] : ~his/her~ current health as excellent [Good] : ~his/her~  mood as  good [Discussed at today's visit] : Advance Directives Discussed at today's visit

## 2021-07-19 NOTE — ASSESSMENT
[FreeTextEntry1] : \par anxiety\par Discussed diagnosis of anxiety and depression with the patient and potential outcomes/side affects of treatment versus non treatment. Medications were assessed and described at length. Side affects and black box warning were discussed.  Patient was advised to continue will all medications prescribed and the need for compliance was discussed and emphasized. Patient was advised to not stop medications without discussing with a health care provider first. Patient was advised to continue psychotherapy or seek therapy if not currently attending. Patient was educated on addictive potential of controlled substances and was counseled to use only as needed and sparingly. Patient verbalized understanding of all the above.\par acute anxiety due to breakup with girlfriend who cheated on him with his best friend,\par severely anxious, cannot sleep, always anxious, drinking excessively\par tried and failed zoloft, only wants xanax as needed\par has used xanax in the past with success, last used july 2020 for breakup with different partner\par patient is extremely anxious and depressed\par xanax .25 not working, taking 2-3 at a time\par started seeing psychologist\par will give higher dose for now for short duration - last summer only needed briding with anti anxiety meds for one month, will try higher dosage\par also discussed lexapro - failed zoloft, but encouraged trying lexapro, will try\par \par \par hep c\par reviewed bw with patient\par \par etoh abuse\par will try curb\par \par cholesterol\par The patient was counseled on a low cholesterol diet and on medication compliance. Patient was advised to generally limit foods fried in oil, high in saturated fat, cheese, eggs and red meat. Patient was advised to continue on current medications and to followup in office for necessary blood work in three months.\par finally taking medications\par \par smoking\par Smoking cessation was discussed with patient. All options were explained to the patient including available medications and their mechanism of action. Side affects and black box warnings were discussed as well. Patient was counseled on risks and benefits of available medications and risks of continued smoking including death. Greater than 5 minutes spent counseling patient.\par smoking 1 ppd, excessive due to anxiety\par \par hand pain\par refer to neurologist\par failed hand surgeon

## 2021-07-19 NOTE — HISTORY OF PRESENT ILLNESS
[de-identified] : 51 year old male is here for a followup visit. Patient is here for medication renewals and for blood work discussion. Medications and allergies were reviewed and assessed.  \par \par patient is severely stressed, drinking a lot, smoking a lot\par girlfriend broke up with him and cheated with his best friend\par  [Home] : at home, [unfilled] , at the time of the visit. [Medical Office: ([facility])___] : at ~his/her~ medical office located in [place] [Patient] : the patient [Self] : self

## 2021-07-19 NOTE — PHYSICAL EXAM
[No Acute Distress] : no acute distress [Well Nourished] : well nourished [Well Developed] : well developed [Normal Voice/Communication] : normal voice/communication [No Respiratory Distress] : no respiratory distress  [Normal Affect] : the affect was normal [Normal Insight/Judgement] : insight and judgment were intact

## 2021-07-19 NOTE — REVIEW OF SYSTEMS
[Insomnia] : insomnia [Anxiety] : anxiety [Negative] : Heme/Lymph [de-identified] : acute on chronic

## 2021-08-16 ENCOUNTER — RX RENEWAL (OUTPATIENT)
Age: 51
End: 2021-08-16

## 2021-09-23 RX ORDER — ALPRAZOLAM 0.5 MG/1
0.5 TABLET ORAL
Qty: 30 | Refills: 0 | Status: DISCONTINUED | COMMUNITY
Start: 2020-04-08 | End: 2021-09-23

## 2021-09-30 ENCOUNTER — APPOINTMENT (OUTPATIENT)
Dept: FAMILY MEDICINE | Facility: CLINIC | Age: 51
End: 2021-09-30

## 2021-10-08 ENCOUNTER — RX RENEWAL (OUTPATIENT)
Age: 51
End: 2021-10-08

## 2021-10-15 ENCOUNTER — RX RENEWAL (OUTPATIENT)
Age: 51
End: 2021-10-15

## 2021-10-19 NOTE — ED PROVIDER NOTE - NS ED ATTENDING STATEMENT MOD
I have personally seen and examined this patient.  I have fully participated in the care of this patient. I have reviewed all pertinent clinical information, including history, physical exam, plan and the Resident’s note and agree except as noted. At baseline pt amb about 25-30 feet, mostly WC bound

## 2021-11-22 ENCOUNTER — APPOINTMENT (OUTPATIENT)
Dept: FAMILY MEDICINE | Facility: CLINIC | Age: 51
End: 2021-11-22
Payer: MEDICAID

## 2021-11-22 PROCEDURE — 99214 OFFICE O/P EST MOD 30 MIN: CPT | Mod: 95

## 2021-11-22 NOTE — HISTORY OF PRESENT ILLNESS
[Other Location: e.g. Home (Enter Location, City,State)___] : at [unfilled] [Verbal consent obtained from patient] : the patient, [unfilled] [Other Location: e.g. School (Enter Location, City,State)___] : at [unfilled], at the time of the visit. [FreeTextEntry1] : Anxiety [de-identified] : Anxiety-much better with seroquel.   \par -Currently down in [place].  Will be returning to [place] in December. \par \par Getting at least 8 hours of sleep per night. \par Mood doing very well since starting Seroquel.  Anxiety and mood doing much better. \par Staying active with bicycling. \par Never had therapist. Has good support network and friends he can talk to. \par \par Medications and allergies reviewed.\par

## 2021-11-22 NOTE — PLAN
[FreeTextEntry1] : Mood doing well-refilled rx. \par \par Patient not fasting.  Will go to lab for fasting bloodwork when gets back to [place], rx given.  Overdue for labwork.  \par \par Will adjust meds based on labs. \par Patient expressed understanding of plan.\par

## 2021-11-22 NOTE — PHYSICAL EXAM
[No Respiratory Distress] : no respiratory distress  [Normal] : affect was normal and insight and judgment were intact [de-identified] : No visible rash

## 2021-12-14 ENCOUNTER — RX RENEWAL (OUTPATIENT)
Age: 51
End: 2021-12-14

## 2021-12-29 ENCOUNTER — APPOINTMENT (OUTPATIENT)
Dept: FAMILY MEDICINE | Facility: CLINIC | Age: 51
End: 2021-12-29
Payer: MEDICAID

## 2021-12-29 VITALS
BODY MASS INDEX: 23.03 KG/M2 | OXYGEN SATURATION: 96 % | SYSTOLIC BLOOD PRESSURE: 134 MMHG | HEART RATE: 100 BPM | DIASTOLIC BLOOD PRESSURE: 82 MMHG | TEMPERATURE: 97.7 F | HEIGHT: 72 IN | WEIGHT: 170 LBS

## 2021-12-29 DIAGNOSIS — Z00.00 ENCOUNTER FOR GENERAL ADULT MEDICAL EXAMINATION W/OUT ABNORMAL FINDINGS: ICD-10-CM

## 2021-12-29 PROCEDURE — 99396 PREV VISIT EST AGE 40-64: CPT | Mod: 25

## 2021-12-29 NOTE — HISTORY OF PRESENT ILLNESS
[de-identified] : 51 year old male  here for annual well visit. Patient's blood work was drawn and medications reviewed. Patient's past medical history was reviewed, allergies verified and problems were identified and assessed. Patients medications were reviewed. Patient is feeling well with no new or active complaints at this time.\par \par patient is severely stressed, drinking a lot, smoking a lot\par girlfriend broke up with him and cheated with his best friend\par

## 2021-12-29 NOTE — REVIEW OF SYSTEMS
[Insomnia] : insomnia [Anxiety] : anxiety [Negative] : Heme/Lymph [de-identified] : acute on chronic

## 2021-12-29 NOTE — PHYSICAL EXAM
[No Acute Distress] : no acute distress [Well Nourished] : well nourished [Well Developed] : well developed [Normal Voice/Communication] : normal voice/communication [No Accessory Muscle Use] : no accessory muscle use [Regular Rhythm] : with a regular rhythm [Normal S1, S2] : normal S1 and S2 [Normal Affect] : the affect was normal [Normal Insight/Judgement] : insight and judgment were intact

## 2021-12-29 NOTE — HEALTH RISK ASSESSMENT
[Excellent] : ~his/her~ current health as excellent [Good] : ~his/her~  mood as  good [Yes] : Yes [4 or more  times a week (4 pts)] : 4 or more  times a week (4 points) [7 to 9 (3 pts)] : 7 to 9 (3  points) [Daily or almost daily (4 pts)] : Daily or almost daily (4 points) [No falls in past year] : Patient reported no falls in the past year [0] : 2) Feeling down, depressed, or hopeless: Not at all (0) [PHQ-2 Negative - No further assessment needed] : PHQ-2 Negative - No further assessment needed [MNM1Crsgc] : 0 [Employed] : employed [Single] : single [# Of Children ___] : has [unfilled] children [Fully functional (bathing, dressing, toileting, transferring, walking, feeding)] : Fully functional (bathing, dressing, toileting, transferring, walking, feeding) [Fully functional (using the telephone, shopping, preparing meals, housekeeping, doing laundry, using] : Fully functional and needs no help or supervision to perform IADLs (using the telephone, shopping, preparing meals, housekeeping, doing laundry, using transportation, managing medications and managing finances) [de-identified] :  [Discussed at today's visit] : Advance Directives Discussed at today's visit

## 2021-12-29 NOTE — ASSESSMENT
[FreeTextEntry1] : Patient was counseled on healthy eating habits, on daily exercise and stress relief. All medications and allergies were reviewed with the patient and any adjustments necessary were made. Patient was counseled to try engage in an exercise activity for at least 30 minutes 3-4 times a week.  Patient was advised to eat a diet low in fat and carbohydrates and high in protein, with plenty of vegetables. Patient was advised to try and engage in relaxing activities whenever possible.\par The patients blood was draw in office and will be followed and assessed for any issues.  Patient was told to return to the office if any issues arise.  Unless otherwise stated, the patient is to continue all other medications as previously prescribed.\par \par did not get covid vaccine\par \par anxiety\par Discussed diagnosis of anxiety and depression with the patient and potential outcomes/side affects of treatment versus non treatment. Medications were assessed and described at length. Side affects and black box warning were discussed.  Patient was advised to continue will all medications prescribed and the need for compliance was discussed and emphasized. Patient was advised to not stop medications without discussing with a health care provider first. Patient was advised to continue psychotherapy or seek therapy if not currently attending. Patient was educated on addictive potential of controlled substances and was counseled to use only as needed and sparingly. Patient verbalized understanding of all the above.\par acute anxiety due to breakup with girlfriend who cheated on him with his best friend,\par severely anxious, cannot sleep, always anxious, drinking excessively\par tried and failed zoloft, only wants xanax as needed\par has used xanax in the past with success, last used july 2020 for breakup with different partner\par patient is extremely anxious and depressed\par xanax .25 not working, taking 2-3 at a time\par started seeing psychologist\par will give higher dose for now for short duration - last summer only needed briding with anti anxiety meds for one month, will try higher dosage\par also discussed lexapro - failed zoloft, but encouraged trying lexapro, will try\par \par \par hep c\par reviewed bw with patient\par \par etoh abuse\par will try curb\par \par cholesterol\par The patient was counseled on a low cholesterol diet and on medication compliance. Patient was advised to generally limit foods fried in oil, high in saturated fat, cheese, eggs and red meat. Patient was advised to continue on current medications and to followup in office for necessary blood work in three months.\par finally taking medications\par \par smoking\par Smoking cessation was discussed with patient. All options were explained to the patient including available medications and their mechanism of action. Side affects and black box warnings were discussed as well. Patient was counseled on risks and benefits of available medications and risks of continued smoking including death. Greater than 5 minutes spent counseling patient.\par smoking 1 ppd, excessive due to anxiety\par \par hand pain\par refer to neurologist\par failed hand surgeon

## 2021-12-30 LAB
ALBUMIN SERPL ELPH-MCNC: 4.9 G/DL
ALP BLD-CCNC: 72 U/L
ALT SERPL-CCNC: 29 U/L
ANION GAP SERPL CALC-SCNC: 15 MMOL/L
APPEARANCE: CLEAR
AST SERPL-CCNC: 23 U/L
BASOPHILS # BLD AUTO: 0.04 K/UL
BASOPHILS NFR BLD AUTO: 0.5 %
BILIRUB SERPL-MCNC: 0.6 MG/DL
BILIRUBIN URINE: NEGATIVE
BLOOD URINE: NEGATIVE
BUN SERPL-MCNC: 10 MG/DL
CALCIUM SERPL-MCNC: 10.3 MG/DL
CHLORIDE SERPL-SCNC: 99 MMOL/L
CHOLEST SERPL-MCNC: 289 MG/DL
CO2 SERPL-SCNC: 25 MMOL/L
COLOR: NORMAL
COVID-19 NUCLEOCAPSID  GAM ANTIBODY INTERPRETATION: NEGATIVE
COVID-19 SPIKE DOMAIN ANTIBODY INTERPRETATION: NEGATIVE
CREAT SERPL-MCNC: 0.89 MG/DL
EOSINOPHIL # BLD AUTO: 0.1 K/UL
EOSINOPHIL NFR BLD AUTO: 1.2 %
ESTIMATED AVERAGE GLUCOSE: 111 MG/DL
GLUCOSE QUALITATIVE U: NEGATIVE
GLUCOSE SERPL-MCNC: 103 MG/DL
HBA1C MFR BLD HPLC: 5.5 %
HCT VFR BLD CALC: 49.1 %
HDLC SERPL-MCNC: 90 MG/DL
HGB BLD-MCNC: 16 G/DL
IMM GRANULOCYTES NFR BLD AUTO: 0.5 %
KETONES URINE: NEGATIVE
LDLC SERPL CALC-MCNC: 168 MG/DL
LEUKOCYTE ESTERASE URINE: NEGATIVE
LYMPHOCYTES # BLD AUTO: 1.48 K/UL
LYMPHOCYTES NFR BLD AUTO: 18.2 %
MAN DIFF?: NORMAL
MCHC RBC-ENTMCNC: 30.5 PG
MCHC RBC-ENTMCNC: 32.6 GM/DL
MCV RBC AUTO: 93.7 FL
MONOCYTES # BLD AUTO: 0.65 K/UL
MONOCYTES NFR BLD AUTO: 8 %
NEUTROPHILS # BLD AUTO: 5.8 K/UL
NEUTROPHILS NFR BLD AUTO: 71.6 %
NITRITE URINE: NEGATIVE
NONHDLC SERPL-MCNC: 199 MG/DL
PH URINE: 6.5
PLATELET # BLD AUTO: 382 K/UL
POTASSIUM SERPL-SCNC: 4.7 MMOL/L
PROT SERPL-MCNC: 7.7 G/DL
PROTEIN URINE: NEGATIVE
PSA SERPL-MCNC: 1.02 NG/ML
RBC # BLD: 5.24 M/UL
RBC # FLD: 13.8 %
SARS-COV-2 AB SERPL IA-ACNC: 0.4 U/ML
SARS-COV-2 AB SERPL QL IA: 0.1 INDEX
SODIUM SERPL-SCNC: 139 MMOL/L
SPECIFIC GRAVITY URINE: 1.01
TRIGL SERPL-MCNC: 155 MG/DL
TSH SERPL-ACNC: 1.61 UIU/ML
UROBILINOGEN URINE: NORMAL
WBC # FLD AUTO: 8.11 K/UL

## 2022-01-06 ENCOUNTER — RX RENEWAL (OUTPATIENT)
Age: 52
End: 2022-01-06

## 2022-01-10 ENCOUNTER — RX RENEWAL (OUTPATIENT)
Age: 52
End: 2022-01-10

## 2022-07-03 ENCOUNTER — RX RENEWAL (OUTPATIENT)
Age: 52
End: 2022-07-03

## 2022-07-05 ENCOUNTER — RX RENEWAL (OUTPATIENT)
Age: 52
End: 2022-07-05

## 2022-07-06 ENCOUNTER — RX RENEWAL (OUTPATIENT)
Age: 52
End: 2022-07-06

## 2022-07-20 NOTE — ED PROVIDER NOTE - PHYSICAL EXAMINATION
Gen: AAOx3, non-toxic  Head: NCAT  HEENT: EOMI, PERRLA, oral mucosa moist, normal conjunctiva  Lung: CTAB, no respiratory distress, no wheezes/rhonchi/rales B/L, speaking in full sentences  CV: RRR, no murmurs, rubs or gallops  Abd: soft, Right sided upper and lower quadrant TTP ND, no guarding, no CVA tenderness, no rebound tenderness  MSK: no visible deformities, full range of motion of all 4 exts  Neuro: No focal sensory or motor deficits  Skin: Warm, well perfused, no rash  Psych: normal affect.   ~Smith Hogan MD (PGY1)
no

## 2022-08-02 ENCOUNTER — RX RENEWAL (OUTPATIENT)
Age: 52
End: 2022-08-02

## 2022-08-09 ENCOUNTER — RX RENEWAL (OUTPATIENT)
Age: 52
End: 2022-08-09

## 2022-08-26 ENCOUNTER — APPOINTMENT (OUTPATIENT)
Dept: FAMILY MEDICINE | Facility: CLINIC | Age: 52
End: 2022-08-26

## 2022-08-26 VITALS
TEMPERATURE: 98.1 F | HEART RATE: 76 BPM | OXYGEN SATURATION: 98 % | WEIGHT: 170 LBS | HEIGHT: 72 IN | BODY MASS INDEX: 23.03 KG/M2 | DIASTOLIC BLOOD PRESSURE: 80 MMHG | SYSTOLIC BLOOD PRESSURE: 124 MMHG

## 2022-08-26 DIAGNOSIS — L98.9 DISORDER OF THE SKIN AND SUBCUTANEOUS TISSUE, UNSPECIFIED: ICD-10-CM

## 2022-08-26 PROCEDURE — 36415 COLL VENOUS BLD VENIPUNCTURE: CPT

## 2022-08-26 PROCEDURE — 99214 OFFICE O/P EST MOD 30 MIN: CPT | Mod: 25

## 2022-08-26 NOTE — HISTORY OF PRESENT ILLNESS
[FreeTextEntry1] : Here for follow-up; needs med refills.\par  [de-identified] : Here for follow-up; needs med refills.\par \par Getting anxious in afternoon. Every afternoon 2pm-4pm.  Takes Lexapro in the evening. \par Solid 8 hours of sleep per night.\par Swimming-a lot in [place]. bikes up to ten miles for exericse. \par Splits time between [place] and [place].\par Broke up with gf-last summer, lost a lot of weight.  \par \par \par \par

## 2022-08-26 NOTE — PLAN
[FreeTextEntry1] : Will follow up labwork drawn in office today.\par \par Anxiety-discussed switching timing of Lexapro.  Taking Lexapro and Seroquel in evening.\par Advised can increase Lexapro if still with persistent anxiety.  \par Patient will try morning administration. \par \par Will adjust meds based on labs. \par Patient expressed understanding of plan.\par

## 2022-08-26 NOTE — REVIEW OF SYSTEMS
[Anxiety] : anxiety [Negative] : Genitourinary [Headache] : no headache [Dizziness] : no dizziness [Suicidal] : not suicidal [Depression] : no depression

## 2022-08-29 LAB
ALBUMIN SERPL ELPH-MCNC: 5 G/DL
ALP BLD-CCNC: 67 U/L
ALT SERPL-CCNC: 34 U/L
ANION GAP SERPL CALC-SCNC: 13 MMOL/L
AST SERPL-CCNC: 24 U/L
BASOPHILS # BLD AUTO: 0.07 K/UL
BASOPHILS NFR BLD AUTO: 0.9 %
BILIRUB SERPL-MCNC: 0.4 MG/DL
BUN SERPL-MCNC: 10 MG/DL
CALCIUM SERPL-MCNC: 9.7 MG/DL
CHLORIDE SERPL-SCNC: 103 MMOL/L
CHOLEST SERPL-MCNC: 255 MG/DL
CO2 SERPL-SCNC: 27 MMOL/L
CREAT SERPL-MCNC: 0.81 MG/DL
EGFR: 106 ML/MIN/1.73M2
EOSINOPHIL # BLD AUTO: 0.12 K/UL
EOSINOPHIL NFR BLD AUTO: 1.6 %
ESTIMATED AVERAGE GLUCOSE: 105 MG/DL
GLUCOSE SERPL-MCNC: 91 MG/DL
HBA1C MFR BLD HPLC: 5.3 %
HCT VFR BLD CALC: 47.7 %
HDLC SERPL-MCNC: 86 MG/DL
HGB BLD-MCNC: 15.1 G/DL
IMM GRANULOCYTES NFR BLD AUTO: 0.8 %
LDLC SERPL CALC-MCNC: 140 MG/DL
LYMPHOCYTES # BLD AUTO: 1.69 K/UL
LYMPHOCYTES NFR BLD AUTO: 22.1 %
MAN DIFF?: NORMAL
MCHC RBC-ENTMCNC: 30.9 PG
MCHC RBC-ENTMCNC: 31.7 GM/DL
MCV RBC AUTO: 97.5 FL
MONOCYTES # BLD AUTO: 0.6 K/UL
MONOCYTES NFR BLD AUTO: 7.8 %
NEUTROPHILS # BLD AUTO: 5.11 K/UL
NEUTROPHILS NFR BLD AUTO: 66.8 %
NONHDLC SERPL-MCNC: 169 MG/DL
PLATELET # BLD AUTO: 396 K/UL
POTASSIUM SERPL-SCNC: 4.6 MMOL/L
PROT SERPL-MCNC: 7 G/DL
RBC # BLD: 4.89 M/UL
RBC # FLD: 13.4 %
SODIUM SERPL-SCNC: 142 MMOL/L
TRIGL SERPL-MCNC: 143 MG/DL
TSH SERPL-ACNC: 1.52 UIU/ML
WBC # FLD AUTO: 7.65 K/UL

## 2022-09-06 ENCOUNTER — RX RENEWAL (OUTPATIENT)
Age: 52
End: 2022-09-06

## 2022-10-03 ENCOUNTER — APPOINTMENT (OUTPATIENT)
Dept: DERMATOLOGY | Facility: CLINIC | Age: 52
End: 2022-10-03

## 2022-10-03 DIAGNOSIS — B36.0 PITYRIASIS VERSICOLOR: ICD-10-CM

## 2022-10-03 DIAGNOSIS — Q82.5 CONGENITAL NON-NEOPLASTIC NEVUS: ICD-10-CM

## 2022-10-03 DIAGNOSIS — L82.1 OTHER SEBORRHEIC KERATOSIS: ICD-10-CM

## 2022-10-03 PROCEDURE — 99203 OFFICE O/P NEW LOW 30 MIN: CPT

## 2022-12-05 ENCOUNTER — RX RENEWAL (OUTPATIENT)
Age: 52
End: 2022-12-05

## 2023-03-10 ENCOUNTER — NON-APPOINTMENT (OUTPATIENT)
Age: 53
End: 2023-03-10

## 2023-03-10 ENCOUNTER — APPOINTMENT (OUTPATIENT)
Dept: FAMILY MEDICINE | Facility: CLINIC | Age: 53
End: 2023-03-10
Payer: MEDICAID

## 2023-03-10 VITALS
HEIGHT: 72 IN | SYSTOLIC BLOOD PRESSURE: 115 MMHG | HEART RATE: 83 BPM | OXYGEN SATURATION: 98 % | TEMPERATURE: 98.1 F | DIASTOLIC BLOOD PRESSURE: 72 MMHG | WEIGHT: 170 LBS | BODY MASS INDEX: 23.03 KG/M2

## 2023-03-10 DIAGNOSIS — F41.0 PANIC DISORDER [EPISODIC PAROXYSMAL ANXIETY]: ICD-10-CM

## 2023-03-10 DIAGNOSIS — G47.00 INSOMNIA, UNSPECIFIED: ICD-10-CM

## 2023-03-10 PROCEDURE — 99214 OFFICE O/P EST MOD 30 MIN: CPT | Mod: 25

## 2023-03-10 RX ORDER — KETOCONAZOLE 20.5 MG/ML
2 SHAMPOO, SUSPENSION TOPICAL
Qty: 1 | Refills: 2 | Status: DISCONTINUED | COMMUNITY
Start: 2022-10-03 | End: 2023-03-10

## 2023-03-10 NOTE — REVIEW OF SYSTEMS
[Fever] : no fever [Chills] : no chills [Nasal Discharge] : no nasal discharge [Sore Throat] : no sore throat [Chest Pain] : no chest pain [Palpitations] : no palpitations [Shortness Of Breath] : no shortness of breath [Wheezing] : no wheezing [Nausea] : no nausea [Constipation] : no constipation [Diarrhea] : no diarrhea [Vomiting] : no vomiting [Dysuria] : no dysuria [Headache] : no headache [Dizziness] : no dizziness [Suicidal] : not suicidal [Anxiety] : no anxiety [de-identified] : sleeping through the night

## 2023-03-10 NOTE — PHYSICAL EXAM
[No Acute Distress] : no acute distress [Well Developed] : well developed [Normal Oropharynx] : the oropharynx was normal [No Lymphadenopathy] : no lymphadenopathy [Normal Rate] : normal rate  [Normal S1, S2] : normal S1 and S2 [No Edema] : there was no peripheral edema [No Extremity Clubbing/Cyanosis] : no extremity clubbing/cyanosis [Normal] : affect was normal and insight and judgment were intact [de-identified] : mild erythema, no exudate

## 2023-03-10 NOTE — HISTORY OF PRESENT ILLNESS
[FreeTextEntry1] : Here for follow-up; needs med refills.\par  [de-identified] : Here for follow-up; needs med refills.\par \par Noticed worsening muscle aches.  Stopped the statin 5 days ago.  Feels like his muscle aches are better.  \par \par Sleeping 8 hours a night.  Reports mood has been doing well.  Not meeting with a therapist.  Feels his mood is doing okay currently. \par \par Had COVID in December 2023.  \par Medications and allergies reviewed.\par

## 2023-03-10 NOTE — PLAN
[FreeTextEntry1] : Had 2 cups of coffee with half and half. \par \par Will follow up labwork drawn in office today.\par Mood-doing well, no suicidal thoughts, anxiety is doing well. \par \par Discussed changing statin. \par \par Will adjust meds based on labs. \par Patient expressed understanding of plan.\par \par Throat Irritation-recommending salt water gargles. \par \par

## 2023-03-14 LAB
ALBUMIN SERPL ELPH-MCNC: 4.5 G/DL
ALP BLD-CCNC: 86 U/L
ALT SERPL-CCNC: 26 U/L
ANION GAP SERPL CALC-SCNC: 13 MMOL/L
AST SERPL-CCNC: 26 U/L
BASOPHILS # BLD AUTO: 0.05 K/UL
BASOPHILS NFR BLD AUTO: 0.6 %
BILIRUB SERPL-MCNC: 0.3 MG/DL
BUN SERPL-MCNC: 9 MG/DL
CALCIUM SERPL-MCNC: 9.7 MG/DL
CHLORIDE SERPL-SCNC: 101 MMOL/L
CHOLEST SERPL-MCNC: 256 MG/DL
CO2 SERPL-SCNC: 25 MMOL/L
CREAT SERPL-MCNC: 0.85 MG/DL
EGFR: 105 ML/MIN/1.73M2
EOSINOPHIL # BLD AUTO: 0.13 K/UL
EOSINOPHIL NFR BLD AUTO: 1.7 %
ESTIMATED AVERAGE GLUCOSE: 114 MG/DL
GLUCOSE SERPL-MCNC: 80 MG/DL
HBA1C MFR BLD HPLC: 5.6 %
HCT VFR BLD CALC: 43.1 %
HDLC SERPL-MCNC: 85 MG/DL
HGB BLD-MCNC: 13.9 G/DL
IMM GRANULOCYTES NFR BLD AUTO: 0.5 %
LDLC SERPL CALC-MCNC: 127 MG/DL
LYMPHOCYTES # BLD AUTO: 2.01 K/UL
LYMPHOCYTES NFR BLD AUTO: 25.7 %
MAN DIFF?: NORMAL
MCHC RBC-ENTMCNC: 29.7 PG
MCHC RBC-ENTMCNC: 32.3 GM/DL
MCV RBC AUTO: 92.1 FL
MONOCYTES # BLD AUTO: 0.92 K/UL
MONOCYTES NFR BLD AUTO: 11.7 %
NEUTROPHILS # BLD AUTO: 4.68 K/UL
NEUTROPHILS NFR BLD AUTO: 59.8 %
NONHDLC SERPL-MCNC: 170 MG/DL
PLATELET # BLD AUTO: 324 K/UL
POTASSIUM SERPL-SCNC: 4.2 MMOL/L
PROT SERPL-MCNC: 7 G/DL
PSA SERPL-MCNC: 0.29 NG/ML
RBC # BLD: 4.68 M/UL
RBC # FLD: 14 %
SODIUM SERPL-SCNC: 139 MMOL/L
TRIGL SERPL-MCNC: 219 MG/DL
TSH SERPL-ACNC: 2.86 UIU/ML
WBC # FLD AUTO: 7.83 K/UL

## 2023-03-14 RX ORDER — ATORVASTATIN CALCIUM 20 MG/1
20 TABLET, FILM COATED ORAL
Qty: 90 | Refills: 1 | Status: DISCONTINUED | COMMUNITY
Start: 2020-03-17 | End: 2023-03-14

## 2023-06-20 ENCOUNTER — RX RENEWAL (OUTPATIENT)
Age: 53
End: 2023-06-20

## 2023-07-17 ENCOUNTER — RX RENEWAL (OUTPATIENT)
Age: 53
End: 2023-07-17

## 2023-10-06 ENCOUNTER — APPOINTMENT (OUTPATIENT)
Dept: FAMILY MEDICINE | Facility: CLINIC | Age: 53
End: 2023-10-06
Payer: MEDICAID

## 2023-10-06 VITALS
TEMPERATURE: 97.5 F | HEART RATE: 68 BPM | SYSTOLIC BLOOD PRESSURE: 128 MMHG | OXYGEN SATURATION: 97 % | HEIGHT: 72 IN | RESPIRATION RATE: 17 BRPM | DIASTOLIC BLOOD PRESSURE: 82 MMHG | BODY MASS INDEX: 25.19 KG/M2 | WEIGHT: 186 LBS

## 2023-10-06 DIAGNOSIS — F41.9 ANXIETY DISORDER, UNSPECIFIED: ICD-10-CM

## 2023-10-06 DIAGNOSIS — E78.00 PURE HYPERCHOLESTEROLEMIA, UNSPECIFIED: ICD-10-CM

## 2023-10-06 PROCEDURE — 99214 OFFICE O/P EST MOD 30 MIN: CPT | Mod: 25

## 2023-10-09 LAB
ALBUMIN SERPL ELPH-MCNC: 4.7 G/DL
ALP BLD-CCNC: 77 U/L
ALT SERPL-CCNC: 36 U/L
ANION GAP SERPL CALC-SCNC: 14 MMOL/L
AST SERPL-CCNC: 27 U/L
BASOPHILS # BLD AUTO: 0.05 K/UL
BASOPHILS NFR BLD AUTO: 0.6 %
BILIRUB SERPL-MCNC: 0.5 MG/DL
BUN SERPL-MCNC: 10 MG/DL
CALCIUM SERPL-MCNC: 9.5 MG/DL
CHLORIDE SERPL-SCNC: 100 MMOL/L
CO2 SERPL-SCNC: 24 MMOL/L
CREAT SERPL-MCNC: 0.81 MG/DL
EGFR: 105 ML/MIN/1.73M2
EOSINOPHIL # BLD AUTO: 0.22 K/UL
EOSINOPHIL NFR BLD AUTO: 2.8 %
ESTIMATED AVERAGE GLUCOSE: 103 MG/DL
GLUCOSE SERPL-MCNC: 98 MG/DL
HBA1C MFR BLD HPLC: 5.2 %
HCT VFR BLD CALC: 45.9 %
HGB BLD-MCNC: 15 G/DL
IMM GRANULOCYTES NFR BLD AUTO: 0.5 %
LYMPHOCYTES # BLD AUTO: 1.99 K/UL
LYMPHOCYTES NFR BLD AUTO: 25 %
MAN DIFF?: NORMAL
MCHC RBC-ENTMCNC: 31.3 PG
MCHC RBC-ENTMCNC: 32.7 GM/DL
MCV RBC AUTO: 95.6 FL
MONOCYTES # BLD AUTO: 0.9 K/UL
MONOCYTES NFR BLD AUTO: 11.3 %
NEUTROPHILS # BLD AUTO: 4.76 K/UL
NEUTROPHILS NFR BLD AUTO: 59.8 %
PLATELET # BLD AUTO: 299 K/UL
POTASSIUM SERPL-SCNC: 4 MMOL/L
PROT SERPL-MCNC: 7.2 G/DL
RBC # BLD: 4.8 M/UL
RBC # FLD: 13.4 %
SODIUM SERPL-SCNC: 137 MMOL/L
TSH SERPL-ACNC: 3.57 UIU/ML
WBC # FLD AUTO: 7.96 K/UL

## 2023-10-14 ENCOUNTER — RX RENEWAL (OUTPATIENT)
Age: 53
End: 2023-10-14

## 2023-10-17 LAB
CHOLEST SERPL-MCNC: 312 MG/DL
HDLC SERPL-MCNC: 82 MG/DL
LDLC SERPL CALC-MCNC: 205 MG/DL
NONHDLC SERPL-MCNC: 230 MG/DL
TRIGL SERPL-MCNC: 142 MG/DL

## 2024-01-09 ENCOUNTER — RX RENEWAL (OUTPATIENT)
Age: 54
End: 2024-01-09

## 2024-01-09 RX ORDER — QUETIAPINE 200 MG/1
200 TABLET, FILM COATED, EXTENDED RELEASE ORAL DAILY
Qty: 90 | Refills: 0 | Status: ACTIVE | COMMUNITY
Start: 2021-09-23 | End: 1900-01-01

## 2024-01-09 NOTE — H&P ADULT - NSHPSOURCEINFORD_GEN_ALL_CORE
Patient/Other Family Member Is This A New Presentation, Or A Follow-Up?: Skin Lesion What Type Of Note Output Would You Prefer (Optional)?: Standard Output How Severe Is Your Skin Lesion?: moderate Has Your Skin Lesion Been Treated?: not been treated

## 2024-01-22 ENCOUNTER — RX RENEWAL (OUTPATIENT)
Age: 54
End: 2024-01-22

## 2024-01-22 RX ORDER — ESCITALOPRAM OXALATE 10 MG/1
10 TABLET ORAL
Qty: 90 | Refills: 0 | Status: ACTIVE | COMMUNITY
Start: 2021-07-19 | End: 1900-01-01

## 2024-01-25 ENCOUNTER — RX RENEWAL (OUTPATIENT)
Age: 54
End: 2024-01-25

## 2024-01-25 RX ORDER — PRAVASTATIN SODIUM 10 MG/1
10 TABLET ORAL
Qty: 90 | Refills: 0 | Status: ACTIVE | COMMUNITY
Start: 2023-03-14 | End: 1900-01-01

## 2024-07-20 ENCOUNTER — RX RENEWAL (OUTPATIENT)
Age: 54
End: 2024-07-20

## 2024-07-21 ENCOUNTER — NON-APPOINTMENT (OUTPATIENT)
Age: 54
End: 2024-07-21

## 2024-08-29 ENCOUNTER — RX RENEWAL (OUTPATIENT)
Age: 54
End: 2024-08-29

## 2024-09-27 ENCOUNTER — RX RENEWAL (OUTPATIENT)
Age: 54
End: 2024-09-27

## 2024-10-19 NOTE — H&P PST ADULT - NSANTHOSAYNRD_GEN_A_CORE
No
No. TOÑO screening performed.  STOP BANG Legend: 0-2 = LOW Risk; 3-4 = INTERMEDIATE Risk; 5-8 = HIGH Risk

## 2024-10-30 ENCOUNTER — RX RENEWAL (OUTPATIENT)
Age: 54
End: 2024-10-30

## 2024-12-11 ENCOUNTER — RX RENEWAL (OUTPATIENT)
Age: 54
End: 2024-12-11

## 2024-12-17 NOTE — H&P ADULT - NSTOBACCOSCREENHP_GEN_A_NCS
Using a flashback needle with ultrasound (CensorNet) the right femoral vein was succesfully accessed in an antegrade fashion over the guidewire using a NEEDLE PROCEDURE L7 CM OD18 GA THIN WALL [facility]. Ultrasound found the vessel was patent. No

## 2024-12-25 PROBLEM — F10.90 ALCOHOL USE: Status: ACTIVE | Noted: 2017-12-05

## 2025-01-15 ENCOUNTER — RX RENEWAL (OUTPATIENT)
Age: 55
End: 2025-01-15

## 2025-01-31 ENCOUNTER — APPOINTMENT (OUTPATIENT)
Dept: FAMILY MEDICINE | Facility: CLINIC | Age: 55
End: 2025-01-31
Payer: MEDICAID

## 2025-01-31 DIAGNOSIS — F41.9 ANXIETY DISORDER, UNSPECIFIED: ICD-10-CM

## 2025-01-31 PROCEDURE — 99213 OFFICE O/P EST LOW 20 MIN: CPT | Mod: 95

## 2025-03-04 ENCOUNTER — RX RENEWAL (OUTPATIENT)
Age: 55
End: 2025-03-04

## 2025-04-08 ENCOUNTER — APPOINTMENT (OUTPATIENT)
Dept: FAMILY MEDICINE | Facility: CLINIC | Age: 55
End: 2025-04-08
Payer: MEDICAID

## 2025-04-08 ENCOUNTER — RX RENEWAL (OUTPATIENT)
Age: 55
End: 2025-04-08

## 2025-04-08 ENCOUNTER — NON-APPOINTMENT (OUTPATIENT)
Age: 55
End: 2025-04-08

## 2025-04-08 ENCOUNTER — TRANSCRIPTION ENCOUNTER (OUTPATIENT)
Age: 55
End: 2025-04-08

## 2025-04-08 VITALS
TEMPERATURE: 97.6 F | OXYGEN SATURATION: 97 % | BODY MASS INDEX: 28.17 KG/M2 | HEIGHT: 72 IN | RESPIRATION RATE: 16 BRPM | HEART RATE: 88 BPM | WEIGHT: 208 LBS | SYSTOLIC BLOOD PRESSURE: 132 MMHG | DIASTOLIC BLOOD PRESSURE: 84 MMHG

## 2025-04-08 DIAGNOSIS — Z72.0 TOBACCO USE: ICD-10-CM

## 2025-04-08 DIAGNOSIS — Z09 ENCOUNTER FOR FOLLOW-UP EXAMINATION AFTER COMPLETED TREATMENT FOR CONDITIONS OTHER THAN MALIGNANT NEOPLASM: ICD-10-CM

## 2025-04-08 DIAGNOSIS — Z12.5 ENCOUNTER FOR SCREENING FOR MALIGNANT NEOPLASM OF PROSTATE: ICD-10-CM

## 2025-04-08 DIAGNOSIS — F10.10 ALCOHOL ABUSE, UNCOMPLICATED: ICD-10-CM

## 2025-04-08 DIAGNOSIS — E78.00 PURE HYPERCHOLESTEROLEMIA, UNSPECIFIED: ICD-10-CM

## 2025-04-08 DIAGNOSIS — F41.9 ANXIETY DISORDER, UNSPECIFIED: ICD-10-CM

## 2025-04-08 PROCEDURE — 99495 TRANSJ CARE MGMT MOD F2F 14D: CPT | Mod: 25

## 2025-04-08 PROCEDURE — 93000 ELECTROCARDIOGRAM COMPLETE: CPT

## 2025-04-08 RX ORDER — NICOTINE 21 MG/24HR
14 PATCH, TRANSDERMAL 24 HOURS TRANSDERMAL DAILY
Qty: 1 | Refills: 1 | Status: ACTIVE | COMMUNITY
Start: 2025-04-08 | End: 1900-01-01

## 2025-04-08 RX ORDER — NALTREXONE HYDROCHLORIDE 50 MG/1
50 TABLET, FILM COATED ORAL DAILY
Qty: 30 | Refills: 0 | Status: ACTIVE | COMMUNITY
Start: 1900-01-01 | End: 1900-01-01

## 2025-04-10 LAB
ALBUMIN SERPL ELPH-MCNC: 4.4 G/DL
ALP BLD-CCNC: 96 U/L
ALT SERPL-CCNC: 67 U/L
ANION GAP SERPL CALC-SCNC: 14 MMOL/L
APPEARANCE: CLEAR
AST SERPL-CCNC: 28 U/L
BASOPHILS # BLD AUTO: 0.06 K/UL
BASOPHILS NFR BLD AUTO: 0.6 %
BILIRUB SERPL-MCNC: 0.4 MG/DL
BILIRUBIN URINE: NEGATIVE
BLOOD URINE: NEGATIVE
BUN SERPL-MCNC: 9 MG/DL
CALCIUM SERPL-MCNC: 9.6 MG/DL
CHLORIDE SERPL-SCNC: 101 MMOL/L
CHOLEST SERPL-MCNC: 262 MG/DL
CO2 SERPL-SCNC: 26 MMOL/L
COLOR: YELLOW
CREAT SERPL-MCNC: 0.92 MG/DL
EGFRCR SERPLBLD CKD-EPI 2021: 99 ML/MIN/1.73M2
EOSINOPHIL # BLD AUTO: 0.23 K/UL
EOSINOPHIL NFR BLD AUTO: 2.3 %
ESTIMATED AVERAGE GLUCOSE: 120 MG/DL
GLUCOSE QUALITATIVE U: NEGATIVE MG/DL
GLUCOSE SERPL-MCNC: 121 MG/DL
HBA1C MFR BLD HPLC: 5.8 %
HCT VFR BLD CALC: 44.2 %
HDLC SERPL-MCNC: 60 MG/DL
HGB BLD-MCNC: 14.4 G/DL
IMM GRANULOCYTES NFR BLD AUTO: 0.6 %
KETONES URINE: NEGATIVE MG/DL
LDLC SERPL-MCNC: 185 MG/DL
LEUKOCYTE ESTERASE URINE: NEGATIVE
LYMPHOCYTES # BLD AUTO: 1.87 K/UL
LYMPHOCYTES NFR BLD AUTO: 18.5 %
MAN DIFF?: NORMAL
MCHC RBC-ENTMCNC: 30.9 PG
MCHC RBC-ENTMCNC: 32.6 G/DL
MCV RBC AUTO: 94.8 FL
MONOCYTES # BLD AUTO: 0.98 K/UL
MONOCYTES NFR BLD AUTO: 9.7 %
NEUTROPHILS # BLD AUTO: 6.91 K/UL
NEUTROPHILS NFR BLD AUTO: 68.3 %
NITRITE URINE: NEGATIVE
NONHDLC SERPL-MCNC: 202 MG/DL
PH URINE: 5.5
PLATELET # BLD AUTO: 357 K/UL
POTASSIUM SERPL-SCNC: 3.6 MMOL/L
PROT SERPL-MCNC: 7 G/DL
PROTEIN URINE: NEGATIVE MG/DL
PSA SERPL-MCNC: 0.23 NG/ML
RBC # BLD: 4.66 M/UL
RBC # FLD: 13 %
SODIUM SERPL-SCNC: 141 MMOL/L
SPECIFIC GRAVITY URINE: 1.01
TRIGL SERPL-MCNC: 97 MG/DL
TSH SERPL-ACNC: 2.8 UIU/ML
UROBILINOGEN URINE: 0.2 MG/DL
WBC # FLD AUTO: 10.11 K/UL

## 2025-04-15 ENCOUNTER — TRANSCRIPTION ENCOUNTER (OUTPATIENT)
Age: 55
End: 2025-04-15

## 2025-05-23 ENCOUNTER — RX RENEWAL (OUTPATIENT)
Age: 55
End: 2025-05-23

## 2025-06-20 ENCOUNTER — RX RENEWAL (OUTPATIENT)
Age: 55
End: 2025-06-20

## 2025-06-25 ENCOUNTER — APPOINTMENT (OUTPATIENT)
Dept: FAMILY MEDICINE | Facility: CLINIC | Age: 55
End: 2025-06-25

## 2025-07-01 ENCOUNTER — APPOINTMENT (OUTPATIENT)
Dept: CARDIOLOGY | Facility: CLINIC | Age: 55
End: 2025-07-01

## 2025-07-07 ENCOUNTER — RX RENEWAL (OUTPATIENT)
Age: 55
End: 2025-07-07

## 2025-08-15 ENCOUNTER — RX RENEWAL (OUTPATIENT)
Age: 55
End: 2025-08-15

## 2025-09-15 ENCOUNTER — RX RENEWAL (OUTPATIENT)
Age: 55
End: 2025-09-15